# Patient Record
Sex: FEMALE | Race: OTHER | Employment: FULL TIME | URBAN - NONMETROPOLITAN AREA
[De-identification: names, ages, dates, MRNs, and addresses within clinical notes are randomized per-mention and may not be internally consistent; named-entity substitution may affect disease eponyms.]

---
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
## 2019-10-15 ENCOUNTER — HOSPITAL ENCOUNTER (OUTPATIENT)
Age: 19
Discharge: HOME | End: 2019-10-15

## 2021-10-18 ENCOUNTER — OFFICE VISIT (OUTPATIENT)
Dept: FAMILY MEDICINE CLINIC | Age: 21
End: 2021-10-18
Payer: MEDICARE

## 2021-10-18 VITALS
SYSTOLIC BLOOD PRESSURE: 128 MMHG | RESPIRATION RATE: 16 BRPM | WEIGHT: 191 LBS | DIASTOLIC BLOOD PRESSURE: 60 MMHG | BODY MASS INDEX: 31.82 KG/M2 | HEART RATE: 96 BPM | HEIGHT: 65 IN

## 2021-10-18 DIAGNOSIS — Z00.00 ANNUAL PHYSICAL EXAM: Primary | ICD-10-CM

## 2021-10-18 DIAGNOSIS — Z23 NEED FOR INFLUENZA VACCINATION: ICD-10-CM

## 2021-10-18 PROCEDURE — 90674 CCIIV4 VAC NO PRSV 0.5 ML IM: CPT | Performed by: FAMILY MEDICINE

## 2021-10-18 PROCEDURE — 99385 PREV VISIT NEW AGE 18-39: CPT | Performed by: FAMILY MEDICINE

## 2021-10-18 PROCEDURE — 90471 IMMUNIZATION ADMIN: CPT | Performed by: FAMILY MEDICINE

## 2021-10-18 PROCEDURE — G8482 FLU IMMUNIZE ORDER/ADMIN: HCPCS | Performed by: FAMILY MEDICINE

## 2021-10-18 RX ORDER — ETONOGESTREL AND ETHINYL ESTRADIOL 11.7; 2.7 MG/1; MG/1
INSERT, EXTENDED RELEASE VAGINAL
COMMUNITY
Start: 2021-10-07

## 2021-10-18 SDOH — ECONOMIC STABILITY: FOOD INSECURITY: WITHIN THE PAST 12 MONTHS, THE FOOD YOU BOUGHT JUST DIDN'T LAST AND YOU DIDN'T HAVE MONEY TO GET MORE.: PATIENT DECLINED

## 2021-10-18 SDOH — ECONOMIC STABILITY: FOOD INSECURITY: WITHIN THE PAST 12 MONTHS, YOU WORRIED THAT YOUR FOOD WOULD RUN OUT BEFORE YOU GOT MONEY TO BUY MORE.: PATIENT DECLINED

## 2021-10-18 ASSESSMENT — ENCOUNTER SYMPTOMS
BLOOD IN STOOL: 0
NAUSEA: 0
SHORTNESS OF BREATH: 0
DIARRHEA: 0
VOMITING: 0
EYES NEGATIVE: 1
ABDOMINAL PAIN: 0

## 2021-10-18 ASSESSMENT — PATIENT HEALTH QUESTIONNAIRE - PHQ9
SUM OF ALL RESPONSES TO PHQ9 QUESTIONS 1 & 2: 0
SUM OF ALL RESPONSES TO PHQ QUESTIONS 1-9: 0
1. LITTLE INTEREST OR PLEASURE IN DOING THINGS: 0
SUM OF ALL RESPONSES TO PHQ QUESTIONS 1-9: 0
SUM OF ALL RESPONSES TO PHQ QUESTIONS 1-9: 0
2. FEELING DOWN, DEPRESSED OR HOPELESS: 0

## 2021-10-18 ASSESSMENT — SOCIAL DETERMINANTS OF HEALTH (SDOH): HOW HARD IS IT FOR YOU TO PAY FOR THE VERY BASICS LIKE FOOD, HOUSING, MEDICAL CARE, AND HEATING?: PATIENT DECLINED

## 2021-10-18 NOTE — PROGRESS NOTES
Immunizations Administered     Name Date Dose Route    Influenza, MDCK Quadv, IM, PF (Flucelvax 2 yrs and older) 10/18/2021 0.5 mL Intramuscular    Site: Deltoid- Left    Lot: [id number]    NDC: 36591-786-16          Vaccine Information Sheet, \"Influenza - Inactivated\"  given to Sharlene Moore, or parent/legal guardian of  Sharlene Moore and verbalized understanding. Patient responses:    Have you ever had a reaction to a flu vaccine? No  Do you have an allergy to eggs, Latex -induced anaphylaxis, neomycin or polymixin? No  Do you have an allergy to Thimerosal, contact lens solution, or Merthiolate? No  Have you ever had [place] Syndrome? No  Do you have any current illness? No  Do you have a temperature above 100.4 degrees? No  Are you pregnant? No  If pregnant, permission obtained from physician? N/A  Do you have an active neurological disorder? No      Flu vaccine given per order. Please see immunization tab.

## 2021-10-18 NOTE — PROGRESS NOTES
Chief Complaint   Patient presents with    New Patient     No concerns, wants flu shot       SUBJECTIVE     Con Rivas is a 23 y. o.female      Patient presents to [facility] care. Previous PCP was Dr. Lyn Pierce. She has been generally healthy. She denies any current complaints. States that she was generally seeing her PCP for yearly exams and would occasionally see him for torticollis. She uses NuvaRing to control her menses. She sees a GYN for Pap testing. Request flu shots today. Non-smoker. 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 BMI 31.78. Past Medical History:   Diagnosis Date    Allergic     Headache     IBS (irritable bowel syndrome)     Inflammatory bowel disease     Scoliosis     Torticollis     Vision abnormalities        Past Surgical History:   Procedure Laterality Date    ADENOIDECTOMY      KNEE ARTHROSCOPY Left     TONSILLECTOMY         Allergies   Allergen Reactions    Cefdinir Nausea Only      Outpatient Medications Prior to Visit   Medication Sig Dispense Refill    etonogestrel-ethinyl estradiol (NUVARING) 0.12-0.015 MG/24HR vaginal ring       ibuprofen (ADVIL;MOTRIN) 600 MG tablet Take 600 mg by mouth 3 times daily  0    acetaminophen (TYLENOL) 325 MG tablet Take 650 mg by mouth every 6 hours as needed for Pain      polyethylene glycol (GLYCOLAX) powder MIX 17 GRAMS WITH WATER AND DRINK ONCE TO THREE TIMES DAILY TO ACHIEVE 2 TO 3 SOFT STOOLS DAILY 527 g 0    ondansetron (ZOFRAN) 4 MG tablet Take 1 tablet by mouth every 8 hours as needed for Nausea or Vomiting 20 tablet 0    fluticasone (FLONASE) 50 MCG/ACT nasal spray 1 spray by Nasal route daily      MONONESSA 0.25-35 MG-MCG per tablet daily  11    ARTIFICIAL TEARS 1.4 % ophthalmic solution as needed  2     No facility-administered medications prior to visit.        Family History   Problem Relation Age of Onset    Arthritis Mother     High Blood Pressure Mother     Anxiety Disorder Father     Asthma Sister     Thyroid Disease Maternal Grandmother     Cancer Paternal Grandfather         Thyroid         Social History     Tobacco Use    Smoking status: Never Smoker    Smokeless tobacco: Never Used   Vaping Use    Vaping Use: Never used   Substance Use Topics    Alcohol use: No    Drug use: No         Review of Systems   Constitutional: Negative for chills, fatigue and fever. HENT: Negative. Eyes: Negative. Respiratory: Negative for shortness of breath. Cardiovascular: Negative for chest pain, palpitations and leg swelling. Gastrointestinal: Negative for abdominal pain, blood in stool, diarrhea, nausea and vomiting. Genitourinary: Negative for dysuria and menstrual problem. Musculoskeletal: Negative. Skin: Negative for rash. Neurological: Negative for dizziness and headaches. Hematological: Negative. Psychiatric/Behavioral: Negative. All other systems reviewed and are negative. OBJECTIVE     /60   Pulse 96   Resp 16   Ht 5' 5\" (1.651 m)   Wt 191 lb (86.6 kg)   BMI 31.78 kg/m²     Physical Exam  Vitals and nursing note reviewed. Constitutional:       General: She is not in acute distress. Appearance: She is well-developed. HENT:      Head: Normocephalic and atraumatic. Right Ear: Tympanic membrane normal.      Left Ear: Tympanic membrane normal.      Mouth/Throat:      Mouth: Mucous membranes are moist.      Pharynx: No posterior oropharyngeal erythema. Eyes:      Conjunctiva/sclera: Conjunctivae normal.   Neck:      Thyroid: No thyromegaly. Cardiovascular:      Rate and Rhythm: Normal rate and regular rhythm. Heart sounds: No murmur heard. Pulmonary:      Effort: Pulmonary effort is normal.      Breath sounds: Normal breath sounds. No wheezing. Abdominal:      General: Bowel sounds are normal.      Palpations: Abdomen is soft. Tenderness: There is no abdominal tenderness. There is no guarding or rebound.    Musculoskeletal:      Cervical back:

## 2021-10-18 NOTE — PATIENT INSTRUCTIONS
Patient Education        Well Visit, Ages 25 to 48: Care Instructions  Overview     Well visits can help you stay healthy. Your doctor has checked your overall health and may have suggested ways to take good care of yourself. Your doctor also may have recommended tests. At home, you can help prevent illness with healthy eating, regular exercise, and other steps. Follow-up care is a key part of your treatment and safety. Be sure to make and go to all appointments, and call your doctor if you are having problems. It's also a good idea to know your test results and keep a list of the medicines you take. How can you care for yourself at home? · Get screening tests that you and your doctor decide on. Screening helps find diseases before any symptoms appear. · Eat healthy foods. Choose fruits, vegetables, whole grains, protein, and low-fat dairy foods. Limit fat, especially saturated fat. Reduce salt in your diet. · Limit alcohol. If you are a man, have no more than 2 drinks a day or 14 drinks a week. If you are a woman, have no more than 1 drink a day or 7 drinks a week. · Get at least 30 minutes of physical activity on most days of the week. Walking is a good choice. You also may want to do other activities, such as running, swimming, cycling, or playing tennis or team sports. Discuss any changes in your exercise program with your doctor. · Reach and stay at a healthy weight. This will lower your risk for many problems, such as obesity, diabetes, heart disease, and high blood pressure. · Do not smoke or allow others to smoke around you. If you need help quitting, talk to your doctor about stop-smoking programs and medicines. These can increase your chances of quitting for good. · Care for your mental health. It is easy to get weighed down by worry and stress. Learn strategies to manage stress, like deep breathing and mindfulness, and stay connected with your family and community.  If you find you often feel sad or hopeless, talk with your doctor. Treatment can help. · Talk to your doctor about whether you have any risk factors for sexually transmitted infections (STIs). You can help prevent STIs if you wait to have sex with a new partner (or partners) until you've each been tested for STIs. It also helps if you use condoms (male or female condoms) and if you limit your sex partners to one person who only has sex with you. Vaccines are available for some STIs, such as HPV. · Use birth control if it's important to you to prevent pregnancy. Talk with your doctor about the choices available and what might be best for you. · If you think you may have a problem with alcohol or drug use, talk to your doctor. This includes prescription medicines (such as amphetamines and opioids) and illegal drugs (such as cocaine and methamphetamine). Your doctor can help you figure out what type of treatment is best for you. · Protect your skin from too much sun. When you're outdoors from 10 a.m. to 4 p.m., stay in the shade or cover up with clothing and a hat with a wide brim. Wear sunglasses that block UV rays. Even when it's cloudy, put broad-spectrum sunscreen (SPF 30 or higher) on any exposed skin. · See a dentist one or two times a year for checkups and to have your teeth cleaned. · Wear a seat belt in the car. When should you call for help? Watch closely for changes in your health, and be sure to contact your doctor if you have any problems or symptoms that concern you. Where can you learn more? Go to https://[email or web address]. org and sign in to your [employer] account. Enter P072 in the [employer] box to learn more about \"Well Visit, Ages 25 to 48: Care Instructions. \"     If you do not have an account, please click on the \"Sign Up Now\" link. Current as of: February 11, 2021               Content Version: 13.0  © 2084-1630 Healthwise, Incorporated.    Care instructions adapted under license by [facility]. If you have questions about a medical condition or this instruction, always ask your healthcare professional. [place] [address] any warranty or liability for your use of this information.

## 2022-12-01 ENCOUNTER — OFFICE VISIT (OUTPATIENT)
Dept: FAMILY MEDICINE CLINIC | Age: 22
End: 2022-12-01
Payer: MEDICARE

## 2022-12-01 VITALS
RESPIRATION RATE: 16 BRPM | SYSTOLIC BLOOD PRESSURE: 120 MMHG | HEART RATE: 86 BPM | HEIGHT: 63 IN | DIASTOLIC BLOOD PRESSURE: 72 MMHG | TEMPERATURE: 98.3 F | WEIGHT: 200.4 LBS | BODY MASS INDEX: 35.51 KG/M2

## 2022-12-01 DIAGNOSIS — M25.50 ARTHRALGIA, UNSPECIFIED JOINT: ICD-10-CM

## 2022-12-01 DIAGNOSIS — Z13.1 SCREENING FOR DIABETES MELLITUS: ICD-10-CM

## 2022-12-01 DIAGNOSIS — Z82.61 FAMILY HISTORY OF RHEUMATOID ARTHRITIS: ICD-10-CM

## 2022-12-01 DIAGNOSIS — M25.40 JOINT SWELLING: ICD-10-CM

## 2022-12-01 DIAGNOSIS — Z00.00 WELL ADULT EXAM: Primary | ICD-10-CM

## 2022-12-01 DIAGNOSIS — R53.83 FATIGUE, UNSPECIFIED TYPE: ICD-10-CM

## 2022-12-01 DIAGNOSIS — Z13.220 SCREENING, LIPID: ICD-10-CM

## 2022-12-01 PROCEDURE — 99395 PREV VISIT EST AGE 18-39: CPT | Performed by: NURSE PRACTITIONER

## 2022-12-01 PROCEDURE — G8484 FLU IMMUNIZE NO ADMIN: HCPCS | Performed by: NURSE PRACTITIONER

## 2022-12-01 RX ORDER — FLUOXETINE HYDROCHLORIDE 20 MG/1
20 CAPSULE ORAL DAILY
Qty: 30 CAPSULE | Refills: 1 | Status: SHIPPED | OUTPATIENT
Start: 2022-12-01

## 2022-12-01 SDOH — ECONOMIC STABILITY: FOOD INSECURITY: WITHIN THE PAST 12 MONTHS, THE FOOD YOU BOUGHT JUST DIDN'T LAST AND YOU DIDN'T HAVE MONEY TO GET MORE.: PATIENT DECLINED

## 2022-12-01 SDOH — ECONOMIC STABILITY: FOOD INSECURITY: WITHIN THE PAST 12 MONTHS, YOU WORRIED THAT YOUR FOOD WOULD RUN OUT BEFORE YOU GOT MONEY TO BUY MORE.: PATIENT DECLINED

## 2022-12-01 ASSESSMENT — PATIENT HEALTH QUESTIONNAIRE - PHQ9
SUM OF ALL RESPONSES TO PHQ QUESTIONS 1-9: 0
2. FEELING DOWN, DEPRESSED OR HOPELESS: 0
1. LITTLE INTEREST OR PLEASURE IN DOING THINGS: 0
SUM OF ALL RESPONSES TO PHQ QUESTIONS 1-9: 0
SUM OF ALL RESPONSES TO PHQ9 QUESTIONS 1 & 2: 0

## 2022-12-01 ASSESSMENT — SOCIAL DETERMINANTS OF HEALTH (SDOH): HOW HARD IS IT FOR YOU TO PAY FOR THE VERY BASICS LIKE FOOD, HOUSING, MEDICAL CARE, AND HEATING?: PATIENT DECLINED

## 2022-12-01 NOTE — PROGRESS NOTES
Chief Complaint   Patient presents with    Hand Pain     Stiffness in hands and knees x couple weeks. Discuss Medications     Discuss anxiety medication. Javy Perry is a 20 y. o.female    Pt presents for annual wellness physical exam.      Pt complains of joint pain and joints locking up. This is located in the hands and knees mostly. Sometimes hips. They will cramp and wake her up. Some edema. Pt notes her anxiety seems to be getting worse. She states she has been picking at her fingers more. Mom questions some depression as well. More of a \"doom and gloom\" type personality. Some sleep issues but is working nights. She is interested in medications. Review of Systems   Constitutional:  Negative for chills, diaphoresis and fever. Respiratory:  Negative for shortness of breath. Cardiovascular:  Negative for chest pain, palpitations and leg swelling. Gastrointestinal:  Negative for blood in stool, constipation, diarrhea, nausea and vomiting. Genitourinary:  Negative for dysuria and hematuria. Musculoskeletal:  Positive for arthralgias and joint swelling. Negative for myalgias. Neurological:  Negative for dizziness and headaches. Psychiatric/Behavioral:  Positive for sleep disturbance. The patient is nervous/anxious. All other systems reviewed and are negative. OBJECTIVE     /72 (Site: Right Upper Arm)   Pulse 86   Temp 98.3 °F (36.8 °C) (Oral)   Resp 16   Ht 5' 3.25\" (1.607 m)   Wt 200 lb 6.4 oz (90.9 kg)   BMI 35.22 kg/m²     Wt Readings from Last 3 Encounters:   12/01/22 200 lb 6.4 oz (90.9 kg)   10/18/21 191 lb (86.6 kg)   03/24/17 189 lb (85.7 kg) (97 %, Z= 1.85)*     * Growth percentiles are based on CDC (Girls, 2-20 Years) data. Physical Exam  Vitals and nursing note reviewed. Constitutional:       Appearance: She is well-developed. She is obese. HENT:      Head: Normocephalic and atraumatic.       Right Ear: External ear normal. Left Ear: External ear normal.      Nose: Nose normal.   Eyes:      Conjunctiva/sclera: Conjunctivae normal.      Pupils: Pupils are equal, round, and reactive to light. Cardiovascular:      Rate and Rhythm: Normal rate and regular rhythm. Heart sounds: Normal heart sounds. Pulmonary:      Effort: Pulmonary effort is normal.      Breath sounds: Normal breath sounds. Abdominal:      General: Bowel sounds are normal.      Palpations: Abdomen is soft. Musculoskeletal:         General: Normal range of motion. Cervical back: Normal range of motion and neck supple. Skin:     General: Skin is warm and dry. Neurological:      Mental Status: She is alert and oriented to person, place, and time. Deep Tendon Reflexes: Reflexes are normal and symmetric. Psychiatric:         Behavior: Behavior normal.         Thought Content:  Thought content normal.         Judgment: Judgment normal.           Immunization History   Administered Date(s) Administered    COVID-19, MODERNA BLUE border, Primary or Immunocompromised, (age 12y+), IM, 100 mcg/0.5mL 08/24/2021, 09/21/2021    DTaP vaccine 01/18/2000, 03/13/2000, 05/17/2000, 12/21/2000, 03/22/2005    HIB PRP-T (ActHIB, Hiberix) 01/18/2000, 03/13/2000, 05/17/2000    HPV Quadrivalent (Gardasil) 11/23/2010, 12/30/2010, 05/25/2011    Hepatitis A Ped/Adol (Havrix, Vaqta) 11/23/2010, 05/25/2011    Hepatitis B Ped/Adol (Engerix-B, Recombivax HB) 01/18/2000, 03/13/2000, 08/17/2000    Hib PRP-OMP (PedvaxHIB) 12/21/2000    Influenza, FLUARIX, FLULAVAL, FLUZONE (age 10 mo+) AND AFLURIA, (age 1 y+), PF, 0.5mL 08/16/2018, 10/13/2020    Influenza, FLUCELVAX, (age 10 mo+), MDCK, PF, 0.5mL 10/18/2021    Influenza, FLUMIST, (age 2-51 y), Live, Intranasal, 0.2mL 10/18/2013    Influenza, Triv, 3 Years and older, IM, PF (Afluria 5yrs and older) 10/21/2017    MMR 12/21/2000, 03/22/2005    Meningococcal B, OMV (Bexsero) 06/21/2018, 08/30/2018    Meningococcal MCV4P (Menactra) 11/23/2010, 11/25/2015    Polio IPV (IPOL) 01/18/2000, 03/13/2000, 05/17/2000, 03/22/2005    Tdap (Boostrix, Adacel) 11/23/2010    Varicella (Varivax) 12/21/2000, 11/23/2010         Health Maintenance   Topic Date Due    Pap smear  Never done    DTaP/Tdap/Td vaccine (7 - Td or Tdap) 11/23/2020    COVID-19 Vaccine (3 - Booster for Tessa Carwin series) 11/16/2021    Flu vaccine (1) 08/01/2022    HIV screen  12/01/2023 (Originally 11/12/2014)    [address] screen  12/01/2023 (Originally 11/12/2015)    Depression Screen  12/01/2023    Hepatitis A vaccine  Completed    Hib vaccine  Completed    HPV vaccine  Completed    Varicella vaccine  Completed    Meningococcal (ACWY) vaccine  Completed    Pneumococcal 0-64 years Vaccine  Aged Out    Hepatitis C screen  Discontinued         ASSESSMENT       Diagnosis Orders   1. Well adult exam        2. Screening for diabetes mellitus  Comprehensive Metabolic Panel      3. Fatigue, unspecified type  T4, Free    TSH    CBC with Auto Differential    Sedimentation Rate    Rheumatoid Factor    CHIKIS Screen with Reflex    C-Reactive Protein      4. Arthralgia, unspecified joint  T4, Free    TSH    CBC with Auto Differential    Sedimentation Rate    Rheumatoid Factor    CHIKIS Screen with Reflex    C-Reactive Protein      5. Joint swelling  T4, Free    TSH    CBC with Auto Differential    Sedimentation Rate    Rheumatoid Factor    CHIKIS Screen with Reflex    C-Reactive Protein      6. Screening, lipid  Lipid Panel      7.  Family history of rheumatoid arthritis            PLAN        Orders Placed This Encounter   Procedures    T4, Free     Standing Status:   Future     Number of Occurrences:   1     Standing Expiration Date:   12/1/2023    TSH     Standing Status:   Future     Number of Occurrences:   1     Standing Expiration Date:   12/1/2023    CBC with Auto Differential     Standing Status:   Future     Number of Occurrences:   1     Standing Expiration Date:   12/1/2023    Comprehensive Metabolic Panel     Standing Status:   Future     Number of Occurrences:   1     Standing Expiration Date:   12/1/2023    Lipid Panel     Standing Status:   Future     Number of Occurrences:   1     Standing Expiration Date:   12/1/2023     Order Specific Question:   Is Patient Fasting?/# of Hours     Answer:   yes/12    Sedimentation Rate     Standing Status:   Future     Number of Occurrences:   1     Standing Expiration Date:   12/1/2023    Rheumatoid Factor     Standing Status:   Future     Number of Occurrences:   1     Standing Expiration Date:   12/1/2023    CHIKIS Screen with Reflex     Standing Status:   Future     Number of Occurrences:   1     Standing Expiration Date:   12/1/2023    C-Reactive Protein     Standing Status:   Future     Number of Occurrences:   1     Standing Expiration Date:   12/1/2023       Requested Prescriptions     Signed Prescriptions Disp Refills    FLUoxetine (PROZAC) 20 MG capsule 30 capsule 1     Sig: Take 1 capsule by mouth daily     Pap/pelvic - appt coming up  Labs as above  Will start patient on Prozac  Follow up in 4-6 weeks    Electronically signed by ARSH Barton CNP on 12/5/2022 at 8:53 AM

## 2022-12-03 ENCOUNTER — NURSE ONLY (OUTPATIENT)
Dept: LAB | Age: 22
End: 2022-12-03

## 2022-12-03 DIAGNOSIS — R53.83 FATIGUE, UNSPECIFIED TYPE: ICD-10-CM

## 2022-12-03 DIAGNOSIS — Z13.1 SCREENING FOR DIABETES MELLITUS: ICD-10-CM

## 2022-12-03 DIAGNOSIS — Z13.220 SCREENING, LIPID: ICD-10-CM

## 2022-12-03 DIAGNOSIS — M25.50 ARTHRALGIA, UNSPECIFIED JOINT: ICD-10-CM

## 2022-12-03 DIAGNOSIS — M25.40 JOINT SWELLING: ICD-10-CM

## 2022-12-03 LAB
ALBUMIN SERPL-MCNC: 4 G/DL (ref 3.5–5.1)
ALP BLD-CCNC: 113 U/L (ref 38–126)
ALT SERPL-CCNC: 14 U/L (ref 11–66)
ANION GAP SERPL CALCULATED.3IONS-SCNC: 12 MEQ/L (ref 8–16)
AST SERPL-CCNC: 16 U/L (ref 5–40)
BASOPHILS # BLD: 0.5 %
BASOPHILS ABSOLUTE: 0 THOU/MM3 (ref 0–0.1)
BILIRUB SERPL-MCNC: 0.5 MG/DL (ref 0.3–1.2)
BUN BLDV-MCNC: 11 MG/DL (ref 7–22)
C-REACTIVE PROTEIN: 2.05 MG/DL (ref 0–1)
CALCIUM SERPL-MCNC: 9.6 MG/DL (ref 8.5–10.5)
CHLORIDE BLD-SCNC: 106 MEQ/L (ref 98–111)
CHOLESTEROL, TOTAL: 198 MG/DL (ref 100–199)
CO2: 22 MEQ/L (ref 23–33)
CREAT SERPL-MCNC: 0.8 MG/DL (ref 0.4–1.2)
EOSINOPHIL # BLD: 1.3 %
EOSINOPHILS ABSOLUTE: 0.1 THOU/MM3 (ref 0–0.4)
ERYTHROCYTE [DISTWIDTH] IN BLOOD BY AUTOMATED COUNT: 12.8 % (ref 11.5–14.5)
ERYTHROCYTE [DISTWIDTH] IN BLOOD BY AUTOMATED COUNT: 40.6 FL (ref 35–45)
GFR SERPL CREATININE-BSD FRML MDRD: > 60 ML/MIN/1.73M2
GLUCOSE BLD-MCNC: 86 MG/DL (ref 70–108)
HCT VFR BLD CALC: 42.7 % (ref 37–47)
HDLC SERPL-MCNC: 56 MG/DL
HEMOGLOBIN: 14 GM/DL (ref 12–16)
IMMATURE GRANS (ABS): 0.03 THOU/MM3 (ref 0–0.07)
IMMATURE GRANULOCYTES: 0.3 %
LDL CHOLESTEROL CALCULATED: 119 MG/DL
LYMPHOCYTES # BLD: 27.6 %
LYMPHOCYTES ABSOLUTE: 2.7 THOU/MM3 (ref 1–4.8)
MCH RBC QN AUTO: 28.8 PG (ref 26–33)
MCHC RBC AUTO-ENTMCNC: 32.8 GM/DL (ref 32.2–35.5)
MCV RBC AUTO: 87.9 FL (ref 81–99)
MONOCYTES # BLD: 5.4 %
MONOCYTES ABSOLUTE: 0.5 THOU/MM3 (ref 0.4–1.3)
NUCLEATED RED BLOOD CELLS: 0 /100 WBC
PLATELET # BLD: 310 THOU/MM3 (ref 130–400)
PMV BLD AUTO: 10.9 FL (ref 9.4–12.4)
POTASSIUM SERPL-SCNC: 4.6 MEQ/L (ref 3.5–5.2)
RBC # BLD: 4.86 MILL/MM3 (ref 4.2–5.4)
RHEUMATOID FACTOR: < 10 IU/ML (ref 0–13)
SEDIMENTATION RATE, ERYTHROCYTE: 41 MM/HR (ref 0–20)
SEG NEUTROPHILS: 64.9 %
SEGMENTED NEUTROPHILS ABSOLUTE COUNT: 6.4 THOU/MM3 (ref 1.8–7.7)
SODIUM BLD-SCNC: 140 MEQ/L (ref 135–145)
T4 FREE: 1.07 NG/DL (ref 0.93–1.76)
TOTAL PROTEIN: 7.3 G/DL (ref 6.1–8)
TRIGL SERPL-MCNC: 116 MG/DL (ref 0–199)
TSH SERPL DL<=0.05 MIU/L-ACNC: 3.06 UIU/ML (ref 0.4–4.2)
WBC # BLD: 9.9 THOU/MM3 (ref 4.8–10.8)

## 2022-12-05 ENCOUNTER — TELEPHONE (OUTPATIENT)
Dept: FAMILY MEDICINE CLINIC | Age: 22
End: 2022-12-05

## 2022-12-05 DIAGNOSIS — R70.0 ELEVATED SED RATE: Primary | ICD-10-CM

## 2022-12-05 DIAGNOSIS — Z82.61 FAMILY HISTORY OF RHEUMATOID ARTHRITIS: ICD-10-CM

## 2022-12-05 DIAGNOSIS — R79.82 ELEVATED C-REACTIVE PROTEIN (CRP): ICD-10-CM

## 2022-12-05 ASSESSMENT — ENCOUNTER SYMPTOMS
BLOOD IN STOOL: 0
CONSTIPATION: 0
DIARRHEA: 0
NAUSEA: 0
SHORTNESS OF BREATH: 0
VOMITING: 0

## 2022-12-05 NOTE — TELEPHONE ENCOUNTER
----- Message from ARSH Siu - CNP sent at 12/5/2022  8:17 AM EST -----  Sed rate and CRP are elevated - can refer to rheumatology if patient desires. Rheumatoid factor is negative  Thyroid is normal  FLP looks good  CMP is normal  CBC is normal.  Still awaiting CHIKIS.  Thanks, TS

## 2023-01-05 ENCOUNTER — OFFICE VISIT (OUTPATIENT)
Dept: RHEUMATOLOGY | Age: 23
End: 2023-01-05
Payer: MEDICARE

## 2023-01-05 ENCOUNTER — PATIENT MESSAGE (OUTPATIENT)
Dept: FAMILY MEDICINE CLINIC | Age: 23
End: 2023-01-05

## 2023-01-05 VITALS
DIASTOLIC BLOOD PRESSURE: 78 MMHG | OXYGEN SATURATION: 98 % | SYSTOLIC BLOOD PRESSURE: 122 MMHG | BODY MASS INDEX: 34.2 KG/M2 | HEIGHT: 63 IN | WEIGHT: 193 LBS | HEART RATE: 74 BPM

## 2023-01-05 DIAGNOSIS — Z79.899 ENCOUNTER FOR LONG-TERM (CURRENT) USE OF HIGH-RISK MEDICATION: ICD-10-CM

## 2023-01-05 DIAGNOSIS — M19.90 INFLAMMATORY ARTHRITIS: Primary | ICD-10-CM

## 2023-01-05 PROCEDURE — G8484 FLU IMMUNIZE NO ADMIN: HCPCS | Performed by: INTERNAL MEDICINE

## 2023-01-05 PROCEDURE — 1036F TOBACCO NON-USER: CPT | Performed by: INTERNAL MEDICINE

## 2023-01-05 PROCEDURE — 99204 OFFICE O/P NEW MOD 45 MIN: CPT | Performed by: INTERNAL MEDICINE

## 2023-01-05 PROCEDURE — G8427 DOCREV CUR MEDS BY ELIG CLIN: HCPCS | Performed by: INTERNAL MEDICINE

## 2023-01-05 PROCEDURE — G8417 CALC BMI ABV UP PARAM F/U: HCPCS | Performed by: INTERNAL MEDICINE

## 2023-01-05 RX ORDER — ACETAMINOPHEN 160 MG
2000 TABLET,DISINTEGRATING ORAL 2 TIMES DAILY
COMMUNITY

## 2023-01-05 RX ORDER — CELECOXIB 200 MG/1
200 CAPSULE ORAL 2 TIMES DAILY
COMMUNITY

## 2023-01-05 RX ORDER — VITAMIN B COMPLEX
1 CAPSULE ORAL DAILY
COMMUNITY

## 2023-01-05 RX ORDER — FOLIC ACID 1 MG/1
1 TABLET ORAL DAILY
Qty: 30 TABLET | Refills: 1 | Status: SHIPPED | OUTPATIENT
Start: 2023-01-05 | End: 2023-02-04

## 2023-01-05 RX ORDER — VIT C/B6/B5/MAGNESIUM/HERB 173 50-5-6-5MG
CAPSULE ORAL DAILY
COMMUNITY

## 2023-01-05 RX ORDER — CHLORAL HYDRATE 500 MG
4000 CAPSULE ORAL DAILY
COMMUNITY

## 2023-01-05 ASSESSMENT — ENCOUNTER SYMPTOMS
ABDOMINAL PAIN: 0
COUGH: 0
VOMITING: 0
NAUSEA: 0
SHORTNESS OF BREATH: 0
BLOOD IN STOOL: 0

## 2023-01-05 ASSESSMENT — JOINT PAIN
TOTAL NUMBER OF SWOLLEN JOINTS: 5
TOTAL NUMBER OF TENDER JOINTS: 8

## 2023-01-05 NOTE — PROGRESS NOTES
[facility]   Rheumatology Clinic Note      1/5/2023       Reason for Consult:  joint pain  Requesting Physician:  Rupali Solomon APRN *     CHIEF COMPLAINT:    Chief Complaint   Patient presents with    New Patient     NP - elevated sed rate, CRP and family history of RA    Other     Pt has pain in both hands and right elbow.           HISTORY OF PRESENT ILLNESS:    22 y.o. female presents today for evaluation of joint pain. Her mother has rheumatoid arthritis, and is concerned that she may have the same.    Noted to have joint pain, swelling and prolonged morning stiffness for few months that has been progressively worsening. Pain is mostly in her hands, wrists. Also involving shoulders. Morning stiffness is prolonged. Episdoes of joint swelling. Celebrex has helped partially.    No skin rash. Has dry eyes and mouth.  No photosensitivity.  No oral ulcers.  No alopecia.      Past Medical History:     has a past medical history of Allergic, Headache, IBS (irritable bowel syndrome), Inflammatory bowel disease, Scoliosis, Torticollis, and Vision abnormalities.    Past Surgical History:     has a past surgical history that includes Adenoidectomy; Tonsillectomy; and Knee arthroscopy (Left).    Current Medications:      Current Outpatient Medications:     Omega-3 1000 MG CAPS, Take 4,000 mg by mouth daily, Disp: , Rfl:     turmeric (QC TUMERIC COMPLEX) 500 MG CAPS, Take by mouth daily, Disp: , Rfl:     Probiotic Product (PROBIOTIC-10 PO), Take 10 mg by mouth daily, Disp: , Rfl:     Cholecalciferol (VITAMIN D3) 50 MCG (2000 UT) CAPS, Take 2,000 capsules by mouth in the morning and at bedtime, Disp: , Rfl:     b complex vitamins capsule, Take 1 capsule by mouth daily, Disp: , Rfl:     celecoxib (CELEBREX) 200 MG capsule, Take 200 mg by mouth 2 times daily, Disp: , Rfl:     methotrexate (RHEUMATREX) 2.5 MG chemo tablet, Take 6 tablets by mouth once a week, Disp: 24 tablet, Rfl: 1    folic acid (FOLVITE) 1 MG  tablet, Take 1 tablet by mouth daily, Disp: 30 tablet, Rfl: 1    FLUoxetine (PROZAC) 20 MG capsule, Take 1 capsule by mouth daily, Disp: 30 capsule, Rfl: 1    etonogestrel-ethinyl estradiol (NUVARING) 0.12-0.015 MG/24HR vaginal ring, , Disp: , Rfl:     acetaminophen (TYLENOL) 325 MG tablet, Take 650 mg by mouth every 6 hours as needed for Pain, Disp: , Rfl:     Allergies:    Cefdinir    Social History:     reports that she has never smoked. She has never used smokeless tobacco. She reports that she does not drink alcohol and does not use drugs. Family History:   family history includes Anxiety Disorder in her father; Arthritis in her mother; Asthma in her sister; Cancer in her paternal grandfather; High Blood Pressure in her mother; Thyroid Disease in her maternal grandmother. REVIEW OF SYSTEMS:    Review of Systems   Constitutional:  Positive for fatigue. Negative for chills, fever and unexpected weight change. HENT:  Negative for mouth sores. Respiratory:  Negative for cough and shortness of breath. Cardiovascular:  Negative for chest pain and leg swelling. Gastrointestinal:  Negative for abdominal pain, blood in stool, nausea and vomiting. Skin:  Negative for rash. PHYSICAL EXAM:    Vitals:    /78 (Site: Left Upper Arm, Position: Sitting, Cuff Size: Medium Adult)   Pulse 74   Ht 5' 3\" (1.6 m)   Wt 193 lb (87.5 kg)   SpO2 98%   BMI 34.19 kg/m²     Physical Exam  Constitutional:       General: She is not in acute distress. Appearance: Normal appearance. HENT:      Mouth/Throat:      Mouth: Mucous membranes are dry. Pharynx: Oropharynx is clear. Eyes:      Extraocular Movements: Extraocular movements intact. Conjunctiva/sclera: Conjunctivae normal.   Cardiovascular:      Rate and Rhythm: Normal rate and regular rhythm. Heart sounds: Normal heart sounds. No murmur heard. No friction rub.    Pulmonary:      Effort: Pulmonary effort is normal. No respiratory distress. Breath sounds: Normal breath sounds. No wheezing or rhonchi. Musculoskeletal:         General: Swelling and tenderness present. No deformity. Normal range of motion. Cervical back: Normal range of motion and neck supple. Right lower leg: No edema. Left lower leg: No edema. Lymphadenopathy:      Cervical: No cervical adenopathy. Skin:     General: Skin is warm and dry. Findings: No rash. Neurological:      General: No focal deficit present. Mental Status: She is alert and oriented to person, place, and time. Mental status is at baseline.       Gait: Gait normal.   Psychiatric:         Mood and Affect: Mood normal.      Physical Exam        ONEAL-28 (ESR): 4.91 (Moderate disease activity)  ONEAL-28 (CRP): 4.37 (Moderate disease activity)         DATA:     Latest Reference Range & Units 12/3/22 06:53   Sodium 135 - 145 meq/L 140   Potassium 3.5 - 5.2 meq/L 4.6   Chloride 98 - 111 meq/L 106   CO2 23 - 33 meq/L 22 (L)   BUN,BUNPL 7 - 22 mg/dL 11   Creatinine 0.4 - 1.2 mg/dL 0.8   Anion Gap 8.0 - 16.0 meq/L 12.0   Est, Glom Filt Rate >60 ml/min/1.73m2 >60   Glucose, Random 70 - 108 mg/dL 86   CALCIUM, SERUM, 942830 8.5 - 10.5 mg/dL 9.6   Total Protein 6.1 - 8.0 g/dL 7.3      Latest Reference Range & Units 12/3/22 06:53   CRP 0.00 - 1.00 mg/dl 2.05 (H)      Latest Reference Range & Units 12/3/22 06:53   Albumin 3.5 - 5.1 g/dL 4.0   Alk Phos 38 - 126 U/L 113   ALT 11 - 66 U/L 14   AST 5 - 40 U/L 16   Bilirubin 0.3 - 1.2 mg/dL 0.5   Total Protein 6.1 - 8.0 g/dL 7.3      Latest Reference Range & Units 12/3/22 06:53   WBC 4.8 - 10.8 thou/mm3 9.9   RBC 4.20 - 5.40 mill/mm3 4.86   Hemoglobin Quant 12.0 - 16.0 gm/dl 14.0   Hematocrit 37.0 - 47.0 % 42.7   MCV 81.0 - 99.0 fL 87.9   MCH 26.0 - 33.0 pg 28.8   MCHC 32.2 - 35.5 gm/dl 32.8   MPV 9.4 - 12.4 fL 10.9   RDW-CV 11.5 - 14.5 % 12.8   RDW-SD 35.0 - 45.0 fL 40.6   Platelet Count 220 - 400 thou/mm3 310      Latest Reference Range & Units 12/3/22 06:53   Sed Rate 0 - 20 mm/hr 41 (H)      Latest Reference Range & Units 12/3/22 06:53   CHIKIS SCREEN None Detected  Detected ! Rheumatoid Factor 0 - 13 IU/mL < 10          IMPRESSION/RECOMMENDATIONS:      1. Inflammatory arthritis, polyarticular symmetric, strong family h/o RA, she has positive CHIKIS. -- obtain labs today to assess AVISE CTD panel  -- discussed with pt treatment options. She agreed to start methotrexate 15 mg PO once weekly and folic acid 1 mg daily. Counseled pt about pregnancy risk when taking this medication and she expressed understanding. Discussed with pt the benefits, risks and adverse effects of this medication. Patient expressed understanding. 2. High risk med requiring close monitoring:  -- MTX: monitor CBC and CMP every 4-6 weeks     RTC in 6 weeks        Orders Placed This Encounter   Procedures    Miscellaneous Sendout     Standing Status:   Future     Standing Expiration Date:   7/5/2023     Order Specific Question:   Specify Req.  Test (1 Test/Order)     Answer:   AVISE CTD PANEL    CBC with Auto Differential     Standing Status:   Future     Standing Expiration Date:   7/5/2023    Comprehensive Metabolic Panel     Standing Status:   Future     Standing Expiration Date:   7/5/2023    C-Reactive Protein     Standing Status:   Future     Standing Expiration Date:   7/5/2023    Sedimentation Rate     Standing Status:   Future     Standing Expiration Date:   7/5/2023        Emelia Peres MD    [address] [facility]  [address] [facility]. [address] [facility]  [address]

## 2023-01-05 NOTE — TELEPHONE ENCOUNTER
From: Neal Eckert  To: Chuy Mane  Sent: 1/5/2023 11:42 AM EST  Subject: Prozac    Stevan Quintero,    My anxiety med was helping but I am starting to feel I am getting used to it and would like to know if it can be increased or piggybacked with something else. I feel my anxiety spiking more regularly.      Thanks,  [employer]

## 2023-01-06 RX ORDER — FLUOXETINE HYDROCHLORIDE 40 MG/1
40 CAPSULE ORAL DAILY
Qty: 30 CAPSULE | Refills: 1 | Status: SHIPPED | OUTPATIENT
Start: 2023-01-06

## 2023-01-07 ENCOUNTER — PATIENT MESSAGE (OUTPATIENT)
Dept: RHEUMATOLOGY | Age: 23
End: 2023-01-07

## 2023-01-09 RX ORDER — METHYLPREDNISOLONE 4 MG/1
TABLET ORAL
Qty: 1 KIT | Refills: 0 | Status: SHIPPED | OUTPATIENT
Start: 2023-01-09 | End: 2023-01-15

## 2023-01-09 RX ORDER — CELECOXIB 200 MG/1
200 CAPSULE ORAL DAILY
Qty: 60 CAPSULE | Refills: 3 | Status: SHIPPED | OUTPATIENT
Start: 2023-01-09

## 2023-01-18 ENCOUNTER — NURSE ONLY (OUTPATIENT)
Dept: LAB | Age: 23
End: 2023-01-18

## 2023-01-18 DIAGNOSIS — M19.90 INFLAMMATORY ARTHRITIS: ICD-10-CM

## 2023-01-18 DIAGNOSIS — Z79.899 ENCOUNTER FOR LONG-TERM (CURRENT) USE OF HIGH-RISK MEDICATION: ICD-10-CM

## 2023-01-18 LAB
ALBUMIN SERPL-MCNC: 4.4 G/DL (ref 3.5–5.1)
ALP BLD-CCNC: 106 U/L (ref 38–126)
ALT SERPL-CCNC: 14 U/L (ref 11–66)
ANION GAP SERPL CALCULATED.3IONS-SCNC: 14 MEQ/L (ref 8–16)
AST SERPL-CCNC: 17 U/L (ref 5–40)
BASOPHILS # BLD: 0.7 %
BASOPHILS ABSOLUTE: 0.1 THOU/MM3 (ref 0–0.1)
BILIRUB SERPL-MCNC: 0.8 MG/DL (ref 0.3–1.2)
BUN BLDV-MCNC: 15 MG/DL (ref 7–22)
C-REACTIVE PROTEIN: 1.95 MG/DL (ref 0–1)
CALCIUM SERPL-MCNC: 9.1 MG/DL (ref 8.5–10.5)
CHLORIDE BLD-SCNC: 102 MEQ/L (ref 98–111)
CO2: 23 MEQ/L (ref 23–33)
CREAT SERPL-MCNC: 0.7 MG/DL (ref 0.4–1.2)
EOSINOPHIL # BLD: 0.6 %
EOSINOPHILS ABSOLUTE: 0.1 THOU/MM3 (ref 0–0.4)
ERYTHROCYTE [DISTWIDTH] IN BLOOD BY AUTOMATED COUNT: 12.8 % (ref 11.5–14.5)
ERYTHROCYTE [DISTWIDTH] IN BLOOD BY AUTOMATED COUNT: 40.6 FL (ref 35–45)
GFR SERPL CREATININE-BSD FRML MDRD: > 60 ML/MIN/1.73M2
GLUCOSE BLD-MCNC: 84 MG/DL (ref 70–108)
HCT VFR BLD CALC: 41.3 % (ref 37–47)
HEMOGLOBIN: 13.7 GM/DL (ref 12–16)
IMMATURE GRANS (ABS): 0.05 THOU/MM3 (ref 0–0.07)
IMMATURE GRANULOCYTES: 0.5 %
LYMPHOCYTES # BLD: 21.5 %
LYMPHOCYTES ABSOLUTE: 2.3 THOU/MM3 (ref 1–4.8)
MCH RBC QN AUTO: 28.8 PG (ref 26–33)
MCHC RBC AUTO-ENTMCNC: 33.2 GM/DL (ref 32.2–35.5)
MCV RBC AUTO: 86.9 FL (ref 81–99)
MONOCYTES # BLD: 6.5 %
MONOCYTES ABSOLUTE: 0.7 THOU/MM3 (ref 0.4–1.3)
NUCLEATED RED BLOOD CELLS: 0 /100 WBC
PLATELET # BLD: 284 THOU/MM3 (ref 130–400)
PMV BLD AUTO: 10.7 FL (ref 9.4–12.4)
POTASSIUM SERPL-SCNC: 4.2 MEQ/L (ref 3.5–5.2)
RBC # BLD: 4.75 MILL/MM3 (ref 4.2–5.4)
SEDIMENTATION RATE, ERYTHROCYTE: 23 MM/HR (ref 0–20)
SEG NEUTROPHILS: 70.2 %
SEGMENTED NEUTROPHILS ABSOLUTE COUNT: 7.6 THOU/MM3 (ref 1.8–7.7)
SODIUM BLD-SCNC: 139 MEQ/L (ref 135–145)
TOTAL PROTEIN: 7.1 G/DL (ref 6.1–8)
WBC # BLD: 10.8 THOU/MM3 (ref 4.8–10.8)

## 2023-02-02 RX ORDER — FLUOXETINE HYDROCHLORIDE 40 MG/1
40 CAPSULE ORAL DAILY
Qty: 30 CAPSULE | Refills: 1 | OUTPATIENT
Start: 2023-02-02

## 2023-02-02 NOTE — TELEPHONE ENCOUNTER
Fluoxetine refused due to being filled 1/6/23 #30/1 to [address]. Pt scheduled for appt with TS on 2/7/23, will discuss need for refills at appt.

## 2023-02-07 ENCOUNTER — OFFICE VISIT (OUTPATIENT)
Dept: FAMILY MEDICINE CLINIC | Age: 23
End: 2023-02-07
Payer: MEDICAID

## 2023-02-07 VITALS
DIASTOLIC BLOOD PRESSURE: 84 MMHG | BODY MASS INDEX: 33.66 KG/M2 | RESPIRATION RATE: 16 BRPM | HEART RATE: 76 BPM | SYSTOLIC BLOOD PRESSURE: 122 MMHG | WEIGHT: 190 LBS

## 2023-02-07 DIAGNOSIS — G47.00 INSOMNIA, UNSPECIFIED TYPE: ICD-10-CM

## 2023-02-07 DIAGNOSIS — F41.9 ANXIETY: ICD-10-CM

## 2023-02-07 DIAGNOSIS — F32.A DEPRESSION, UNSPECIFIED DEPRESSION TYPE: Primary | ICD-10-CM

## 2023-02-07 PROCEDURE — 99213 OFFICE O/P EST LOW 20 MIN: CPT | Performed by: NURSE PRACTITIONER

## 2023-02-07 RX ORDER — BUPROPION HYDROCHLORIDE 150 MG/1
150 TABLET ORAL EVERY MORNING
Qty: 30 TABLET | Refills: 3 | Status: SHIPPED | OUTPATIENT
Start: 2023-02-07

## 2023-02-07 RX ORDER — HYDROXYZINE HYDROCHLORIDE 25 MG/1
25 TABLET, FILM COATED ORAL EVERY 8 HOURS PRN
Qty: 30 TABLET | Refills: 0 | Status: SHIPPED | OUTPATIENT
Start: 2023-02-07 | End: 2023-02-17

## 2023-02-07 SDOH — ECONOMIC STABILITY: FOOD INSECURITY: WITHIN THE PAST 12 MONTHS, THE FOOD YOU BOUGHT JUST DIDN'T LAST AND YOU DIDN'T HAVE MONEY TO GET MORE.: PATIENT DECLINED

## 2023-02-07 SDOH — ECONOMIC STABILITY: FOOD INSECURITY: WITHIN THE PAST 12 MONTHS, YOU WORRIED THAT YOUR FOOD WOULD RUN OUT BEFORE YOU GOT MONEY TO BUY MORE.: PATIENT DECLINED

## 2023-02-07 SDOH — ECONOMIC STABILITY: HOUSING INSECURITY
IN THE LAST 12 MONTHS, WAS THERE A TIME WHEN YOU DID NOT HAVE A STEADY PLACE TO SLEEP OR SLEPT IN A SHELTER (INCLUDING NOW)?: PATIENT REFUSED

## 2023-02-07 SDOH — ECONOMIC STABILITY: INCOME INSECURITY: HOW HARD IS IT FOR YOU TO PAY FOR THE VERY BASICS LIKE FOOD, HOUSING, MEDICAL CARE, AND HEATING?: PATIENT DECLINED

## 2023-02-07 ASSESSMENT — PATIENT HEALTH QUESTIONNAIRE - PHQ9
2. FEELING DOWN, DEPRESSED OR HOPELESS: 1
SUM OF ALL RESPONSES TO PHQ QUESTIONS 1-9: 1
1. LITTLE INTEREST OR PLEASURE IN DOING THINGS: 0
SUM OF ALL RESPONSES TO PHQ9 QUESTIONS 1 & 2: 1

## 2023-02-07 NOTE — PROGRESS NOTES
Chief Complaint   Patient presents with    Follow-up     Wants to discuss medication dosage. Radha White is a 20 y. o.female      Here for follow up of anxiety and depression. Feels like anxiety is better but still struggles with depression. Fatigue, not wanting to get out of bed, decreased desire to do things, trouble sleeping at night. She is interested in adding on Wellbutrin XL. Tried atarax for sleep and this did help. Review of Systems   Constitutional:  Positive for fatigue. Negative for chills, diaphoresis and fever. Respiratory:  Negative for shortness of breath. Cardiovascular:  Negative for chest pain, palpitations and leg swelling. Gastrointestinal:  Negative for blood in stool, constipation, diarrhea, nausea and vomiting. Genitourinary:  Negative for dysuria and hematuria. Musculoskeletal:  Negative for myalgias. Neurological:  Negative for dizziness and headaches. Psychiatric/Behavioral:  Positive for decreased concentration and sleep disturbance. All other systems reviewed and are negative. OBJECTIVE     /84   Pulse 76   Resp 16   Wt 190 lb (86.2 kg)   BMI 33.66 kg/m²     Wt Readings from Last 3 Encounters:   02/07/23 190 lb (86.2 kg)   01/05/23 193 lb (87.5 kg)   12/01/22 200 lb 6.4 oz (90.9 kg)       Physical Exam  Vitals and nursing note reviewed. Constitutional:       Appearance: She is well-developed. HENT:      Head: Normocephalic and atraumatic. Right Ear: External ear normal.      Left Ear: External ear normal.      Nose: Nose normal.   Eyes:      Conjunctiva/sclera: Conjunctivae normal.      Pupils: Pupils are equal, round, and reactive to light. Cardiovascular:      Rate and Rhythm: Normal rate and regular rhythm. Heart sounds: Normal heart sounds. Pulmonary:      Effort: Pulmonary effort is normal.      Breath sounds: Normal breath sounds.    Abdominal:      General: Bowel sounds are normal. Palpations: Abdomen is soft. Musculoskeletal:         General: Normal range of motion. Cervical back: Normal range of motion and neck supple. Skin:     General: Skin is warm and dry. Neurological:      Mental Status: She is alert and oriented to person, place, and time. Deep Tendon Reflexes: Reflexes are normal and symmetric. Psychiatric:         Behavior: Behavior normal.         Thought Content:  Thought content normal.         Judgment: Judgment normal.             Immunization History   Administered Date(s) Administered    COVID-19, MODERNA BLUE border, Primary or Immunocompromised, (age 12y+), IM, 100 mcg/0.5mL 08/24/2021, 09/21/2021    DTaP vaccine 01/18/2000, 03/13/2000, 05/17/2000, 12/21/2000, 03/22/2005    HIB PRP-T (ActHIB, Hiberix) 01/18/2000, 03/13/2000, 05/17/2000    HPV Quadrivalent (Gardasil) 11/23/2010, 12/30/2010, 05/25/2011    Hepatitis A Ped/Adol (Havrix, Vaqta) 11/23/2010, 05/25/2011    Hepatitis B Ped/Adol (Engerix-B, Recombivax HB) 01/18/2000, 03/13/2000, 08/17/2000    Hib PRP-OMP (PedvaxHIB) 12/21/2000    Influenza, FLUARIX, FLULAVAL, FLUZONE (age 10 mo+) AND AFLURIA, (age 1 y+), PF, 0.5mL 08/16/2018, 10/13/2020    Influenza, FLUCELVAX, (age 10 mo+), MDCK, PF, 0.5mL 10/18/2021    Influenza, FLUMIST, (age 2-51 y), Live, Intranasal, 0.2mL 10/18/2013    Influenza, Triv, 3 Years and older, IM, PF (Afluria 5yrs and older) 10/21/2017    MMR 12/21/2000, 03/22/2005    Meningococcal B, OMV (Bexsero) 06/21/2018, 08/30/2018    Meningococcal MCV4P (Menactra) 11/23/2010, 11/25/2015    Polio IPV (IPOL) 01/18/2000, 03/13/2000, 05/17/2000, 03/22/2005    Tdap (Boostrix, Adacel) 11/23/2010    Varicella (Varivax) 12/21/2000, 11/23/2010         Health Maintenance   Topic Date Due    DTaP/Tdap/Td vaccine (7 - Td or Tdap) 11/23/2020    COVID-19 Vaccine (3 - Booster for Shayla Bernard series) 11/16/2021    Flu vaccine (1) 08/01/2022    HIV screen  12/01/2023 (Originally 11/12/2014)    [facility] 12/01/2023 (Originally 11/12/2015)    Depression Screen  02/07/2024    Pap smear  01/31/2026    Hepatitis A vaccine  Completed    Hib vaccine  Completed    HPV vaccine  Completed    Varicella vaccine  Completed    Meningococcal (ACWY) vaccine  Completed    Pneumococcal 0-64 years Vaccine  Aged Out    Hepatitis C screen  Discontinued       Future Appointments   Date Time Provider Ger Jensen   2/20/2023  2:30 PM [address] MD N [facility]         ASSESSMENT       Diagnosis Orders   1. Depression, unspecified depression type  buPROPion (WELLBUTRIN XL) 150 MG extended release tablet      2. Anxiety  hydrOXYzine HCl (ATARAX) 25 MG tablet    buPROPion (WELLBUTRIN XL) 150 MG extended release tablet      3. Insomnia, unspecified type  hydrOXYzine HCl (ATARAX) 25 MG tablet          PLAN        Requested Prescriptions     Signed Prescriptions Disp Refills    hydrOXYzine HCl (ATARAX) 25 MG tablet 30 tablet 0     Sig: Take 1 tablet by mouth every 8 hours as needed for Anxiety    buPROPion (WELLBUTRIN XL) 150 MG extended release tablet 30 tablet 3     Sig: Take 1 tablet by mouth every morning       Meds sent to pharmacy  If tolerating, okay to follow up in 6 months       Return in about 6 months (around 8/7/2023).         Electronically signed by ARSH Osborne CNP on 2/8/2023 at 7:41 PM

## 2023-02-08 ASSESSMENT — ENCOUNTER SYMPTOMS
BLOOD IN STOOL: 0
NAUSEA: 0
CONSTIPATION: 0
DIARRHEA: 0
VOMITING: 0
SHORTNESS OF BREATH: 0

## 2023-02-28 ENCOUNTER — TELEPHONE (OUTPATIENT)
Dept: FAMILY MEDICINE CLINIC | Age: 23
End: 2023-02-28

## 2023-02-28 NOTE — TELEPHONE ENCOUNTER
Pt called in requesting medication dosage increase. She is currently taking Wellbutrin  mg and feels that she would benefit with a higher dosage. Pt is requesting Wellbutrin  mg to be sent to [facility]. Please advise.      Please mychart pt back with response.

## 2023-02-28 NOTE — TELEPHONE ENCOUNTER
It has only been 3 weeks, has not had maximum benefit yet. Would recommend giving it another 3 weeks and then reassess.  TS

## 2023-03-02 ENCOUNTER — OFFICE VISIT (OUTPATIENT)
Dept: RHEUMATOLOGY | Age: 23
End: 2023-03-02
Payer: MEDICAID

## 2023-03-02 ENCOUNTER — NURSE ONLY (OUTPATIENT)
Dept: LAB | Age: 23
End: 2023-03-02

## 2023-03-02 VITALS
SYSTOLIC BLOOD PRESSURE: 132 MMHG | HEART RATE: 112 BPM | DIASTOLIC BLOOD PRESSURE: 98 MMHG | OXYGEN SATURATION: 98 % | WEIGHT: 190 LBS | HEIGHT: 63 IN | BODY MASS INDEX: 33.66 KG/M2

## 2023-03-02 DIAGNOSIS — Z79.899 ENCOUNTER FOR LONG-TERM (CURRENT) USE OF HIGH-RISK MEDICATION: ICD-10-CM

## 2023-03-02 DIAGNOSIS — M19.90 INFLAMMATORY ARTHRITIS: ICD-10-CM

## 2023-03-02 LAB
ALBUMIN SERPL BCG-MCNC: 4.1 G/DL (ref 3.5–5.1)
ALP SERPL-CCNC: 85 U/L (ref 38–126)
ALT SERPL W/O P-5'-P-CCNC: 11 U/L (ref 11–66)
ANION GAP SERPL CALC-SCNC: 12 MEQ/L (ref 8–16)
AST SERPL-CCNC: 12 U/L (ref 5–40)
BASOPHILS ABSOLUTE: 0.1 THOU/MM3 (ref 0–0.1)
BASOPHILS NFR BLD AUTO: 0.6 %
BILIRUB SERPL-MCNC: 0.4 MG/DL (ref 0.3–1.2)
BUN SERPL-MCNC: 20 MG/DL (ref 7–22)
CALCIUM SERPL-MCNC: 9.3 MG/DL (ref 8.5–10.5)
CHLORIDE SERPL-SCNC: 105 MEQ/L (ref 98–111)
CO2 SERPL-SCNC: 22 MEQ/L (ref 23–33)
CREAT SERPL-MCNC: 0.7 MG/DL (ref 0.4–1.2)
CRP SERPL-MCNC: 1.53 MG/DL (ref 0–1)
DEPRECATED RDW RBC AUTO: 47.8 FL (ref 35–45)
EOSINOPHIL NFR BLD AUTO: 1.5 %
EOSINOPHILS ABSOLUTE: 0.1 THOU/MM3 (ref 0–0.4)
ERYTHROCYTE [DISTWIDTH] IN BLOOD BY AUTOMATED COUNT: 14.7 % (ref 11.5–14.5)
ERYTHROCYTE [SEDIMENTATION RATE] IN BLOOD BY WESTERGREN METHOD: 34 MM/HR (ref 0–20)
GFR SERPL CREATININE-BSD FRML MDRD: > 60 ML/MIN/1.73M2
GLUCOSE SERPL-MCNC: 93 MG/DL (ref 70–108)
HCT VFR BLD AUTO: 41.6 % (ref 37–47)
HGB BLD-MCNC: 13.6 GM/DL (ref 12–16)
IMM GRANULOCYTES # BLD AUTO: 0.04 THOU/MM3 (ref 0–0.07)
IMM GRANULOCYTES NFR BLD AUTO: 0.5 %
LYMPHOCYTES ABSOLUTE: 2.2 THOU/MM3 (ref 1–4.8)
LYMPHOCYTES NFR BLD AUTO: 25.7 %
MCH RBC QN AUTO: 29.2 PG (ref 26–33)
MCHC RBC AUTO-ENTMCNC: 32.7 GM/DL (ref 32.2–35.5)
MCV RBC AUTO: 89.3 FL (ref 81–99)
MONOCYTES ABSOLUTE: 0.7 THOU/MM3 (ref 0.4–1.3)
MONOCYTES NFR BLD AUTO: 8.7 %
NEUTROPHILS NFR BLD AUTO: 63 %
NRBC BLD AUTO-RTO: 0 /100 WBC
PLATELET # BLD AUTO: 299 THOU/MM3 (ref 130–400)
PMV BLD AUTO: 10.6 FL (ref 9.4–12.4)
POTASSIUM SERPL-SCNC: 4.2 MEQ/L (ref 3.5–5.2)
PROT SERPL-MCNC: 7.3 G/DL (ref 6.1–8)
RBC # BLD AUTO: 4.66 MILL/MM3 (ref 4.2–5.4)
SEGMENTED NEUTROPHILS ABSOLUTE COUNT: 5.4 THOU/MM3 (ref 1.8–7.7)
SODIUM SERPL-SCNC: 139 MEQ/L (ref 135–145)
WBC # BLD AUTO: 8.5 THOU/MM3 (ref 4.8–10.8)

## 2023-03-02 PROCEDURE — 99214 OFFICE O/P EST MOD 30 MIN: CPT | Performed by: INTERNAL MEDICINE

## 2023-03-02 RX ORDER — FOLIC ACID 1 MG/1
1 TABLET ORAL DAILY
Qty: 30 TABLET | Refills: 1 | Status: SHIPPED | OUTPATIENT
Start: 2023-03-02 | End: 2023-04-01

## 2023-03-02 ASSESSMENT — JOINT PAIN
TOTAL NUMBER OF TENDER JOINTS: 1
TOTAL NUMBER OF SWOLLEN JOINTS: 2

## 2023-03-02 ASSESSMENT — ENCOUNTER SYMPTOMS
VOMITING: 0
NAUSEA: 0
SHORTNESS OF BREATH: 0
BLOOD IN STOOL: 0
COUGH: 0
ABDOMINAL PAIN: 0

## 2023-03-02 NOTE — PROGRESS NOTES
[address]   Rheumatology Clinic Note      3/2/2023       Reason for Consult:  joint pain  Requesting Physician:  No ref. provider found     CHIEF COMPLAINT:    Chief Complaint   Patient presents with    Follow-up     6 wk f/u            HISTORY OF PRESENT ILLNESS:    20 y.o. female with polyarticular inflammatory arthritis presents for follow up. In last office visit, methotrexate 15 mg PO weekly and folic acid 1 mg daily were started. Reports improvement in her joint pain and swelling since starting methotrexate. However, reports that effects do not last till her next dose. Tends to have pain and swelling in her joints 2-3 days before her next dose is due. Her pain usually affects her hands, lower back and right shoulder. Since seen last, reports that she has fractured her foot when she twisted it. Has a hard boot on. Past Medical History:     has a past medical history of Allergic, Headache, IBS (irritable bowel syndrome), Inflammatory bowel disease, Scoliosis, Torticollis, and Vision abnormalities. Past Surgical History:     has a past surgical history that includes Adenoidectomy; Tonsillectomy; and Knee arthroscopy (Left).     Current Medications:      Current Outpatient Medications:     folic acid (FOLVITE) 1 MG tablet, Take 1 tablet by mouth daily, Disp: 30 tablet, Rfl: 1    methotrexate (RHEUMATREX) 2.5 MG chemo tablet, Take 8 tablets by mouth once a week, Disp: 24 tablet, Rfl: 1    buPROPion (WELLBUTRIN XL) 150 MG extended release tablet, Take 1 tablet by mouth every morning, Disp: 30 tablet, Rfl: 3    celecoxib (CELEBREX) 200 MG capsule, Take 1 capsule by mouth daily, Disp: 60 capsule, Rfl: 3    FLUoxetine (PROZAC) 40 MG capsule, Take 1 capsule by mouth daily, Disp: 30 capsule, Rfl: 1    Omega-3 1000 MG CAPS, Take 4,000 mg by mouth daily, Disp: , Rfl:     turmeric 500 MG CAPS, Take by mouth daily, Disp: , Rfl:     Probiotic Product (PROBIOTIC-10 PO), Take 10 mg by mouth daily, Disp: , Rfl:     Cholecalciferol (VITAMIN D3) 50 MCG (2000 UT) CAPS, Take 2,000 capsules by mouth in the morning and at bedtime, Disp: , Rfl:     b complex vitamins capsule, Take 1 capsule by mouth daily, Disp: , Rfl:     etonogestrel-ethinyl estradiol (NUVARING) 0.12-0.015 MG/24HR vaginal ring, , Disp: , Rfl:     acetaminophen (TYLENOL) 325 MG tablet, Take 650 mg by mouth every 6 hours as needed for Pain, Disp: , Rfl:     Allergies:    Cefdinir    Social History:     reports that she has never smoked. She has never used smokeless tobacco. She reports that she does not drink alcohol and does not use drugs. Family History:   family history includes Anxiety Disorder in her father; Arthritis in her mother; Asthma in her sister; Cancer in her paternal grandfather; High Blood Pressure in her mother; Thyroid Disease in her maternal grandmother. REVIEW OF SYSTEMS:    Review of Systems   Constitutional:  Positive for fatigue. Negative for chills, fever and unexpected weight change. HENT:  Negative for mouth sores. Respiratory:  Negative for cough and shortness of breath. Cardiovascular:  Negative for chest pain and leg swelling. Gastrointestinal:  Negative for abdominal pain, blood in stool, nausea and vomiting. Skin:  Negative for rash. PHYSICAL EXAM:    Vitals:    BP (!) 132/98 (Site: Left Upper Arm, Position: Sitting, Cuff Size: Medium Adult)   Pulse (!) 112   Ht 5' 2.99\" (1.6 m)   Wt 190 lb (86.2 kg)   SpO2 98%   BMI 33.67 kg/m²     Physical Exam  Constitutional:       General: She is not in acute distress. Appearance: Normal appearance. Eyes:      Conjunctiva/sclera: Conjunctivae normal.   Pulmonary:      Effort: Pulmonary effort is normal.   Musculoskeletal:         General: Swelling and tenderness present. Normal range of motion. Cervical back: Neck supple. Skin:     General: Skin is warm and dry. Findings: No rash.    Neurological:      General: No focal deficit present.      Mental Status: She is alert and oriented to person, place, and time.      Gait: Abnormal gait: has a stability boot in right foot.   Psychiatric:         Mood and Affect: Mood normal.      Physical Exam        ONEAL-28 (ESR): 3.19 (Low disease activity)  ONEAL-28 (CRP): 2.35 (Remission)         DATA:     Latest Reference Range & Units 1/18/23 14:45   Sodium 135 - 145 meq/L 139   Potassium 3.5 - 5.2 meq/L 4.2   Chloride 98 - 111 meq/L 102   CO2 23 - 33 meq/L 23   BUN,BUNPL 7 - 22 mg/dL 15   Creatinine 0.4 - 1.2 mg/dL 0.7   Anion Gap 8.0 - 16.0 meq/L 14.0   Est, Glom Filt Rate >60 ml/min/1.73m2 >60   Glucose, Random 70 - 108 mg/dL 84   CALCIUM, SERUM, 217048 8.5 - 10.5 mg/dL 9.1   Total Protein 6.1 - 8.0 g/dL 7.1       Latest Reference Range & Units 12/3/22 06:53 1/18/23 14:45   CRP 0.00 - 1.00 mg/dl 2.05 (H) 1.95 (H)      Latest Reference Range & Units 1/18/23 14:45   Albumin 3.5 - 5.1 g/dL 4.4   Alk Phos 38 - 126 U/L 106   ALT 11 - 66 U/L 14   AST 5 - 40 U/L 17   BILIRUBIN TOTAL 0.3 - 1.2 mg/dL 0.8   Total Protein 6.1 - 8.0 g/dL 7.1      Latest Reference Range & Units 1/18/23 14:45   WBC 4.8 - 10.8 thou/mm3 10.8   RBC 4.20 - 5.40 mill/mm3 4.75   Hemoglobin Quant 12.0 - 16.0 gm/dl 13.7   Hematocrit 37.0 - 47.0 % 41.3   MCV 81.0 - 99.0 fL 86.9   MCH 26.0 - 33.0 pg 28.8   MCHC 32.2 - 35.5 gm/dl 33.2   MPV 9.4 - 12.4 fL 10.7   RDW-CV 11.5 - 14.5 % 12.8   RDW-SD 35.0 - 45.0 fL 40.6   Platelet Count 130 - 400 thou/mm3 284      Latest Reference Range & Units 12/3/22 06:53 1/18/23 14:45   Sed Rate 0 - 20 mm/hr 41 (H) 23 (H)         AVISE CTD 1/20/2023:            RAPID3 Composite Score = MDHAQ (0-10) + Patient pain VAS (0-10): + Patient global assessment VAS (0-10):     3/2/2023 --- RAPID 3: 1.3 + 6.5 + 3 = 10.8     Remission: <3  Low Disease Activity: <6  Moderate Disease Activity: >=6 and <=12  High Disease Activity: >12      IMPRESSION/RECOMMENDATIONS:      1. Inflammatory arthritis, polyarticular symmetric,  strong family h/o RA, she has positive CHIKIS. Overall improvement since starting methotrexate  -- increase methotrexate to 20 mg PO once weekly and continue folic acid 1 mg daily      2. High risk med requiring close monitoring:  -- MTX: monitor CBC and CMP every 6-8 weeks.  Obtain labs today    RTC in 6 weeks        Orders Placed This Encounter   Procedures    Comprehensive Metabolic Panel     Standing Status:   Future     Standing Expiration Date:   9/2/2023    CBC with Auto Differential     Standing Status:   Future     Standing Expiration Date:   9/2/2023    Sedimentation Rate     Standing Status:   Future     Standing Expiration Date:   9/2/2023    C-Reactive Protein     Standing Status:   Future     Standing Expiration Date:   9/2/2023    CBC with Auto Differential     Standing Status:   Future     Standing Expiration Date:   9/2/2023    Comprehensive Metabolic Panel     Standing Status:   Future     Standing Expiration Date:   3/2/2024    C-Reactive Protein     Standing Status:   Future     Standing Expiration Date:   3/2/2024    Sedimentation Rate     Standing Status:   Future     Standing Expiration Date:   3/2/2024        Dilshad Levy MD    [address] [facility]  [address] [facility]. [address] [facility]  [address] [facility]  [address]

## 2023-03-09 ENCOUNTER — HOSPITAL ENCOUNTER (OUTPATIENT)
Age: 23
Discharge: HOME | End: 2023-03-09
Payer: MEDICAID

## 2023-03-09 ENCOUNTER — HOSPITAL ENCOUNTER (OUTPATIENT)
Dept: GENERAL RADIOLOGY | Age: 23
Discharge: HOME | End: 2023-03-09
Payer: MEDICAID

## 2023-03-09 DIAGNOSIS — M25.571 RIGHT ANKLE PAIN, UNSPECIFIED CHRONICITY: ICD-10-CM

## 2023-03-09 PROCEDURE — 73610 X-RAY EXAM OF ANKLE: CPT

## 2023-03-30 DIAGNOSIS — F41.9 ANXIETY: ICD-10-CM

## 2023-03-30 DIAGNOSIS — G47.00 INSOMNIA, UNSPECIFIED TYPE: ICD-10-CM

## 2023-03-30 RX ORDER — HYDROXYZINE HYDROCHLORIDE 25 MG/1
25 TABLET, FILM COATED ORAL EVERY 8 HOURS PRN
Qty: 30 TABLET | Refills: 0 | Status: SHIPPED | OUTPATIENT
Start: 2023-03-30 | End: 2023-04-09

## 2023-03-30 NOTE — TELEPHONE ENCOUNTER
This medication refill is regarding a electronic request. Refill requested by  Annamaria Gowers . Requested Prescriptions     Pending Prescriptions Disp Refills    hydrOXYzine HCl (ATARAX) 25 MG tablet [Pharmacy Med Name: HYDROXYZINE HCL 25MG TABS (WHITE)] 30 tablet 0     Sig: TAKE 1 TABLET BY MOUTH EVERY 8 HOURS AS NEEDED FOR ANXIETY     Date of last visit: 2/7/2023   Date of next visit: None  Date of last refill: 2/7/23 #30/0    Rx verified, ordered and set to EP.

## 2023-03-31 ENCOUNTER — PATIENT MESSAGE (OUTPATIENT)
Dept: FAMILY MEDICINE CLINIC | Age: 23
End: 2023-03-31

## 2023-03-31 RX ORDER — FLUOXETINE HYDROCHLORIDE 40 MG/1
40 CAPSULE ORAL DAILY
Qty: 90 CAPSULE | Refills: 1 | Status: SHIPPED | OUTPATIENT
Start: 2023-03-31

## 2023-03-31 NOTE — TELEPHONE ENCOUNTER
From: Virginia Mabry  To: Yeni Curtis  Sent: 3/31/2023 10:49 AM EDT  Subject: Refill    Hi. Could I please have refills for my Prozac 40 mg.      Thanks,  [employer]

## 2023-04-04 ENCOUNTER — TELEPHONE (OUTPATIENT)
Dept: FAMILY MEDICINE CLINIC | Age: 23
End: 2023-04-04

## 2023-04-04 DIAGNOSIS — Z83.2 FAMILY HISTORY OF FACTOR V LEIDEN MUTATION: ICD-10-CM

## 2023-04-04 DIAGNOSIS — Z82.49 FAMILY HISTORY OF PULMONARY EMBOLISM: Primary | ICD-10-CM

## 2023-04-04 NOTE — TELEPHONE ENCOUNTER
Lazarus Dikcens. I just found out that my uncle tested positive for Factor V(5) and MTFHR and my grandmother  from PE.  Could you please lmk if we require an appointment to get tested or if we can have an order sent in?

## 2023-04-07 ENCOUNTER — NURSE ONLY (OUTPATIENT)
Dept: LAB | Age: 23
End: 2023-04-07

## 2023-04-07 DIAGNOSIS — Z83.2 FAMILY HISTORY OF FACTOR V LEIDEN MUTATION: ICD-10-CM

## 2023-04-07 DIAGNOSIS — Z79.899 ENCOUNTER FOR LONG-TERM (CURRENT) USE OF HIGH-RISK MEDICATION: ICD-10-CM

## 2023-04-07 DIAGNOSIS — Z82.49 FAMILY HISTORY OF PULMONARY EMBOLISM: ICD-10-CM

## 2023-04-07 DIAGNOSIS — M19.90 INFLAMMATORY ARTHRITIS: ICD-10-CM

## 2023-04-07 LAB
ALBUMIN SERPL BCG-MCNC: 4.3 G/DL (ref 3.5–5.1)
ALP SERPL-CCNC: 118 U/L (ref 38–126)
ALT SERPL W/O P-5'-P-CCNC: 19 U/L (ref 11–66)
ANION GAP SERPL CALC-SCNC: 12 MEQ/L (ref 8–16)
AST SERPL-CCNC: 18 U/L (ref 5–40)
BASOPHILS ABSOLUTE: 0.1 THOU/MM3 (ref 0–0.1)
BASOPHILS NFR BLD AUTO: 0.8 %
BILIRUB SERPL-MCNC: 0.8 MG/DL (ref 0.3–1.2)
BUN SERPL-MCNC: 16 MG/DL (ref 7–22)
CALCIUM SERPL-MCNC: 9.5 MG/DL (ref 8.5–10.5)
CHLORIDE SERPL-SCNC: 104 MEQ/L (ref 98–111)
CO2 SERPL-SCNC: 25 MEQ/L (ref 23–33)
CREAT SERPL-MCNC: 0.8 MG/DL (ref 0.4–1.2)
CRP SERPL-MCNC: 0.56 MG/DL (ref 0–1)
DEPRECATED RDW RBC AUTO: 47.7 FL (ref 35–45)
EOSINOPHIL NFR BLD AUTO: 2 %
EOSINOPHILS ABSOLUTE: 0.2 THOU/MM3 (ref 0–0.4)
ERYTHROCYTE [DISTWIDTH] IN BLOOD BY AUTOMATED COUNT: 14.2 % (ref 11.5–14.5)
ERYTHROCYTE [SEDIMENTATION RATE] IN BLOOD BY WESTERGREN METHOD: 23 MM/HR (ref 0–20)
GFR SERPL CREATININE-BSD FRML MDRD: > 60 ML/MIN/1.73M2
GLUCOSE SERPL-MCNC: 92 MG/DL (ref 70–108)
HCT VFR BLD AUTO: 43.3 % (ref 37–47)
HGB BLD-MCNC: 13.9 GM/DL (ref 12–16)
IMM GRANULOCYTES # BLD AUTO: 0.02 THOU/MM3 (ref 0–0.07)
IMM GRANULOCYTES NFR BLD AUTO: 0.2 %
LYMPHOCYTES ABSOLUTE: 3.1 THOU/MM3 (ref 1–4.8)
LYMPHOCYTES NFR BLD AUTO: 35.5 %
MCH RBC QN AUTO: 29.6 PG (ref 26–33)
MCHC RBC AUTO-ENTMCNC: 32.1 GM/DL (ref 32.2–35.5)
MCV RBC AUTO: 92.3 FL (ref 81–99)
MONOCYTES ABSOLUTE: 0.6 THOU/MM3 (ref 0.4–1.3)
MONOCYTES NFR BLD AUTO: 6.4 %
MTHFR BY PCR SPECIMEN: NORMAL
NEUTROPHILS NFR BLD AUTO: 55.1 %
NRBC BLD AUTO-RTO: 0 /100 WBC
PLATELET # BLD AUTO: 315 THOU/MM3 (ref 130–400)
PMV BLD AUTO: 10.2 FL (ref 9.4–12.4)
POTASSIUM SERPL-SCNC: 4.2 MEQ/L (ref 3.5–5.2)
PROT SERPL-MCNC: 7.4 G/DL (ref 6.1–8)
RBC # BLD AUTO: 4.69 MILL/MM3 (ref 4.2–5.4)
SEGMENTED NEUTROPHILS ABSOLUTE COUNT: 4.7 THOU/MM3 (ref 1.8–7.7)
SODIUM SERPL-SCNC: 141 MEQ/L (ref 135–145)
WBC # BLD AUTO: 8.6 THOU/MM3 (ref 4.8–10.8)

## 2023-05-29 ENCOUNTER — PATIENT MESSAGE (OUTPATIENT)
Dept: RHEUMATOLOGY | Age: 23
End: 2023-05-29

## 2023-05-30 DIAGNOSIS — F41.9 ANXIETY: ICD-10-CM

## 2023-05-30 DIAGNOSIS — F32.A DEPRESSION, UNSPECIFIED DEPRESSION TYPE: ICD-10-CM

## 2023-05-30 RX ORDER — ONDANSETRON 4 MG/1
4 TABLET, FILM COATED ORAL EVERY 8 HOURS PRN
Qty: 30 TABLET | Refills: 0 | Status: SHIPPED | OUTPATIENT
Start: 2023-05-30

## 2023-05-31 RX ORDER — BUPROPION HYDROCHLORIDE 150 MG/1
150 TABLET ORAL EVERY MORNING
Qty: 30 TABLET | Refills: 3 | Status: SHIPPED | OUTPATIENT
Start: 2023-05-31

## 2023-05-31 NOTE — TELEPHONE ENCOUNTER
This medication refill is regarding a electronic request. Refill requested by  [address] [place] [address] Requested Prescriptions     Pending Prescriptions Disp Refills    buPROPion (WELLBUTRIN XL) 150 MG extended release tablet [Pharmacy Med Name: BUPROPION XL 150MG TABLETS (24 H)] 30 tablet 3     Sig: TAKE 1 TABLET BY MOUTH EVERY MORNING     Date of last visit: 2/7/2023   Date of next visit: None  Date of last refill: 2/7/23 #30/3    Rx verified, ordered and set to EP.

## 2023-06-30 ENCOUNTER — NURSE ONLY (OUTPATIENT)
Dept: LAB | Age: 23
End: 2023-06-30

## 2023-06-30 DIAGNOSIS — M19.90 INFLAMMATORY ARTHRITIS: ICD-10-CM

## 2023-06-30 DIAGNOSIS — Z79.899 ENCOUNTER FOR LONG-TERM (CURRENT) USE OF HIGH-RISK MEDICATION: ICD-10-CM

## 2023-06-30 LAB
ALBUMIN SERPL BCG-MCNC: 4.2 G/DL (ref 3.5–5.1)
ALP SERPL-CCNC: 89 U/L (ref 38–126)
ALT SERPL W/O P-5'-P-CCNC: 12 U/L (ref 11–66)
ANION GAP SERPL CALC-SCNC: 12 MEQ/L (ref 8–16)
AST SERPL-CCNC: 14 U/L (ref 5–40)
BASOPHILS ABSOLUTE: 0.1 THOU/MM3 (ref 0–0.1)
BASOPHILS NFR BLD AUTO: 0.7 %
BILIRUB SERPL-MCNC: 0.3 MG/DL (ref 0.3–1.2)
BUN SERPL-MCNC: 11 MG/DL (ref 7–22)
CALCIUM SERPL-MCNC: 9 MG/DL (ref 8.5–10.5)
CHLORIDE SERPL-SCNC: 104 MEQ/L (ref 98–111)
CO2 SERPL-SCNC: 24 MEQ/L (ref 23–33)
CREAT SERPL-MCNC: 0.7 MG/DL (ref 0.4–1.2)
CRP SERPL-MCNC: 1.29 MG/DL (ref 0–1)
DEPRECATED RDW RBC AUTO: 44 FL (ref 35–45)
EOSINOPHIL NFR BLD AUTO: 1.5 %
EOSINOPHILS ABSOLUTE: 0.1 THOU/MM3 (ref 0–0.4)
ERYTHROCYTE [DISTWIDTH] IN BLOOD BY AUTOMATED COUNT: 13.2 % (ref 11.5–14.5)
ERYTHROCYTE [SEDIMENTATION RATE] IN BLOOD BY WESTERGREN METHOD: 24 MM/HR (ref 0–20)
GFR SERPL CREATININE-BSD FRML MDRD: > 60 ML/MIN/1.73M2
GLUCOSE SERPL-MCNC: 97 MG/DL (ref 70–108)
HCT VFR BLD AUTO: 41.6 % (ref 37–47)
HGB BLD-MCNC: 13.4 GM/DL (ref 12–16)
IMM GRANULOCYTES # BLD AUTO: 0.02 THOU/MM3 (ref 0–0.07)
IMM GRANULOCYTES NFR BLD AUTO: 0.2 %
LYMPHOCYTES ABSOLUTE: 1.7 THOU/MM3 (ref 1–4.8)
LYMPHOCYTES NFR BLD AUTO: 18.8 %
MCH RBC QN AUTO: 29.8 PG (ref 26–33)
MCHC RBC AUTO-ENTMCNC: 32.2 GM/DL (ref 32.2–35.5)
MCV RBC AUTO: 92.4 FL (ref 81–99)
MONOCYTES ABSOLUTE: 0.6 THOU/MM3 (ref 0.4–1.3)
MONOCYTES NFR BLD AUTO: 6.8 %
NEUTROPHILS NFR BLD AUTO: 72 %
NRBC BLD AUTO-RTO: 0 /100 WBC
PLATELET # BLD AUTO: 295 THOU/MM3 (ref 130–400)
PMV BLD AUTO: 10.7 FL (ref 9.4–12.4)
POTASSIUM SERPL-SCNC: 4.4 MEQ/L (ref 3.5–5.2)
PROT SERPL-MCNC: 7.4 G/DL (ref 6.1–8)
RBC # BLD AUTO: 4.5 MILL/MM3 (ref 4.2–5.4)
SEGMENTED NEUTROPHILS ABSOLUTE COUNT: 6.5 THOU/MM3 (ref 1.8–7.7)
SODIUM SERPL-SCNC: 140 MEQ/L (ref 135–145)
WBC # BLD AUTO: 9 THOU/MM3 (ref 4.8–10.8)

## 2023-07-19 ENCOUNTER — OFFICE VISIT (OUTPATIENT)
Dept: RHEUMATOLOGY | Age: 23
End: 2023-07-19
Payer: COMMERCIAL

## 2023-07-19 VITALS
DIASTOLIC BLOOD PRESSURE: 74 MMHG | OXYGEN SATURATION: 98 % | WEIGHT: 195.2 LBS | BODY MASS INDEX: 34.59 KG/M2 | SYSTOLIC BLOOD PRESSURE: 122 MMHG | HEIGHT: 63 IN | HEART RATE: 97 BPM

## 2023-07-19 DIAGNOSIS — Z79.899 ENCOUNTER FOR LONG-TERM (CURRENT) USE OF HIGH-RISK MEDICATION: ICD-10-CM

## 2023-07-19 DIAGNOSIS — M19.90 INFLAMMATORY ARTHRITIS: ICD-10-CM

## 2023-07-19 PROCEDURE — 99214 OFFICE O/P EST MOD 30 MIN: CPT | Performed by: INTERNAL MEDICINE

## 2023-07-19 PROCEDURE — G8427 DOCREV CUR MEDS BY ELIG CLIN: HCPCS | Performed by: INTERNAL MEDICINE

## 2023-07-19 PROCEDURE — 1036F TOBACCO NON-USER: CPT | Performed by: INTERNAL MEDICINE

## 2023-07-19 PROCEDURE — G8417 CALC BMI ABV UP PARAM F/U: HCPCS | Performed by: INTERNAL MEDICINE

## 2023-07-19 RX ORDER — HYDROXYCHLOROQUINE SULFATE 200 MG/1
200 TABLET, FILM COATED ORAL DAILY
Qty: 90 TABLET | Refills: 1 | Status: SHIPPED | OUTPATIENT
Start: 2023-07-19 | End: 2024-01-15

## 2023-07-19 RX ORDER — FOLIC ACID 1 MG/1
1 TABLET ORAL DAILY
Qty: 90 TABLET | Refills: 1 | Status: SHIPPED | OUTPATIENT
Start: 2023-07-19 | End: 2024-01-15

## 2023-07-19 ASSESSMENT — JOINT PAIN
TOTAL NUMBER OF SWOLLEN JOINTS: 1
TOTAL NUMBER OF TENDER JOINTS: 3

## 2023-07-19 ASSESSMENT — ENCOUNTER SYMPTOMS
ABDOMINAL PAIN: 0
VOMITING: 0
NAUSEA: 0
COUGH: 0
SHORTNESS OF BREATH: 0

## 2023-07-19 NOTE — PROGRESS NOTES
[address] [facility]   Rheumatology Clinic Note      7/19/2023         CHIEF COMPLAINT:    Chief Complaint   Patient presents with    Follow-up     3 month f/u Inflammatory arthritis    Other     Pt would like to change mtx to injection form. Pain feels about the same 5/10,. Lower back, Hands            HISTORY OF PRESENT ILLNESS:    20 y.o. female with polyarticular inflammatory arthritis presents for follow up. Presently, she is on methotrexate 20 mg PO weekly, folic acid 1 mg daily and hydroxychlorquine 200 mg daily (started last visit). Reports that methotrexate is helping her arthritis, but is not lasting to her next dose. She has good 4-5 days, then she starts experiencing pain, swelling and stiffness in her hands. Pain is most pronounced in her hands, knees and lower back. Morning stiffness lasts for 30 minutes. She is also concerned about tremors in her hands. Past Medical History:     has a past medical history of Allergic, Headache, IBS (irritable bowel syndrome), Inflammatory bowel disease, Scoliosis, Torticollis, and Vision abnormalities. Past Surgical History:     has a past surgical history that includes Adenoidectomy; Tonsillectomy; and Knee arthroscopy (Left).     Current Medications:      Current Outpatient Medications:     Methotrexate, PF, (RASUVO) 25 MG/0.5ML chemo injection pen, Inject 25 mg into the skin once a week, Disp: 4 Adjustable Dose Pre-filled Pen Syringe, Rfl: 1    hydroxychloroquine (PLAQUENIL) 200 MG tablet, Take 1 tablet by mouth daily, Disp: 90 tablet, Rfl: 1    folic acid (FOLVITE) 1 MG tablet, Take 1 tablet by mouth daily, Disp: 90 tablet, Rfl: 1    buPROPion (WELLBUTRIN XL) 150 MG extended release tablet, TAKE 1 TABLET BY MOUTH EVERY MORNING, Disp: 30 tablet, Rfl: 3    ondansetron (ZOFRAN) 4 MG tablet, Take 1 tablet by mouth every 8 hours as needed for Nausea or Vomiting, Disp: 30 tablet, Rfl: 0    celecoxib (CELEBREX) 200 MG capsule, Take 1 capsule by mouth

## 2023-07-21 DIAGNOSIS — Z79.899 ENCOUNTER FOR LONG-TERM (CURRENT) USE OF HIGH-RISK MEDICATION: ICD-10-CM

## 2023-07-21 DIAGNOSIS — M19.90 INFLAMMATORY ARTHRITIS: ICD-10-CM

## 2023-07-21 RX ORDER — HYDROXYCHLOROQUINE SULFATE 200 MG/1
200 TABLET, FILM COATED ORAL DAILY
Qty: 30 TABLET | OUTPATIENT
Start: 2023-07-21 | End: 2024-01-17

## 2023-07-27 ENCOUNTER — OFFICE VISIT (OUTPATIENT)
Dept: FAMILY MEDICINE CLINIC | Age: 23
End: 2023-07-27
Payer: COMMERCIAL

## 2023-07-27 VITALS
HEART RATE: 60 BPM | DIASTOLIC BLOOD PRESSURE: 66 MMHG | SYSTOLIC BLOOD PRESSURE: 124 MMHG | WEIGHT: 195.6 LBS | BODY MASS INDEX: 34.66 KG/M2 | RESPIRATION RATE: 16 BRPM

## 2023-07-27 DIAGNOSIS — G47.00 INSOMNIA, UNSPECIFIED TYPE: ICD-10-CM

## 2023-07-27 DIAGNOSIS — F32.A DEPRESSION, UNSPECIFIED DEPRESSION TYPE: ICD-10-CM

## 2023-07-27 DIAGNOSIS — F41.9 ANXIETY: ICD-10-CM

## 2023-07-27 PROCEDURE — 99213 OFFICE O/P EST LOW 20 MIN: CPT | Performed by: NURSE PRACTITIONER

## 2023-07-27 PROCEDURE — 1036F TOBACCO NON-USER: CPT | Performed by: NURSE PRACTITIONER

## 2023-07-27 PROCEDURE — G8427 DOCREV CUR MEDS BY ELIG CLIN: HCPCS | Performed by: NURSE PRACTITIONER

## 2023-07-27 PROCEDURE — G8417 CALC BMI ABV UP PARAM F/U: HCPCS | Performed by: NURSE PRACTITIONER

## 2023-07-27 RX ORDER — FLUOXETINE HYDROCHLORIDE 20 MG/1
20 CAPSULE ORAL DAILY
Qty: 90 CAPSULE | Refills: 3 | Status: SHIPPED | OUTPATIENT
Start: 2023-07-27

## 2023-07-27 RX ORDER — HYDROXYZINE HYDROCHLORIDE 25 MG/1
25 TABLET, FILM COATED ORAL NIGHTLY PRN
Qty: 90 TABLET | Refills: 0 | Status: SHIPPED | OUTPATIENT
Start: 2023-07-27 | End: 2023-10-25

## 2023-07-27 RX ORDER — FLUOXETINE HYDROCHLORIDE 40 MG/1
40 CAPSULE ORAL DAILY
Qty: 90 CAPSULE | Refills: 3 | Status: SHIPPED | OUTPATIENT
Start: 2023-07-27

## 2023-07-27 RX ORDER — BUPROPION HYDROCHLORIDE 300 MG/1
300 TABLET ORAL EVERY MORNING
Qty: 90 TABLET | Refills: 3 | Status: SHIPPED | OUTPATIENT
Start: 2023-07-27

## 2023-07-27 NOTE — PROGRESS NOTES
Current Outpatient Medications   Medication Sig Dispense Refill    buPROPion (WELLBUTRIN XL) 300 MG extended release tablet Take 1 tablet by mouth every morning 90 tablet 3    FLUoxetine (PROZAC) 40 MG capsule Take 1 capsule by mouth daily With 20 mg for total of 60 mg daily 90 capsule 3    FLUoxetine (PROZAC) 20 MG capsule Take 1 capsule by mouth daily With 40 mg for a total of 60 mg daily 90 capsule 3    hydrOXYzine HCl (ATARAX) 25 MG tablet Take 1 tablet by mouth nightly as needed for Anxiety 90 tablet 0    Methotrexate, PF, (RASUVO) 25 MG/0.5ML chemo injection pen Inject 25 mg into the skin once a week 4 Adjustable Dose Pre-filled Pen Syringe 1    hydroxychloroquine (PLAQUENIL) 200 MG tablet Take 1 tablet by mouth daily 90 tablet 1    folic acid (FOLVITE) 1 MG tablet Take 1 tablet by mouth daily 90 tablet 1    ondansetron (ZOFRAN) 4 MG tablet Take 1 tablet by mouth every 8 hours as needed for Nausea or Vomiting 30 tablet 0    celecoxib (CELEBREX) 200 MG capsule Take 1 capsule by mouth daily 60 capsule 3    Omega-3 1000 MG CAPS Take 4 capsules by mouth daily      turmeric 500 MG CAPS Take by mouth daily      Probiotic Product (PROBIOTIC-10 PO) Take 10 mg by mouth daily      Cholecalciferol (VITAMIN D3) 50 MCG (2000 UT) CAPS Take 2,000 capsules by mouth in the morning and at bedtime      b complex vitamins capsule Take 1 capsule by mouth daily      etonogestrel-ethinyl estradiol (NUVARING) 0.12-0.015 MG/24HR vaginal ring       acetaminophen (TYLENOL) 325 MG tablet Take 2 tablets by mouth every 6 hours as needed for Pain       No current facility-administered medications for this visit. Allergies   Allergen Reactions    Cefdinir Nausea Only       Subjective:    Review of Systems   Psychiatric/Behavioral:  Positive for dysphoric mood. The patient is nervous/anxious.       Objective:     Vitals:    07/27/23 1226   BP: 124/66   Pulse: 60   Resp: 16   Weight: 195 lb 9.6 oz (88.7 kg)       Body mass index is 34.66

## 2023-08-29 RX ORDER — METHOTREXATE 25 MG/.5ML
INJECTION, SOLUTION SUBCUTANEOUS
Qty: 2 ML | Refills: 3 | Status: SHIPPED | OUTPATIENT
Start: 2023-08-29

## 2023-08-29 NOTE — TELEPHONE ENCOUNTER
DOLV: 7/19/23  DONV: 9/19/23  LAST LAB DRAW: 6/30/23  LAST TB TEST: n/a    Lab Results   Component Value Date     06/30/2023    K 4.4 06/30/2023     06/30/2023    CO2 24 06/30/2023    BUN 11 06/30/2023    CREATININE 0.7 06/30/2023    GLUCOSE 97 06/30/2023    CALCIUM 9.0 06/30/2023    PROT 7.4 06/30/2023    LABALBU 4.2 06/30/2023    BILITOT 0.3 06/30/2023    ALKPHOS 89 06/30/2023    AST 14 06/30/2023    ALT 12 06/30/2023    LABGLOM >60 06/30/2023       No results for input(s): WBC, HGB, HCT, MCV, PLT in the last 720 hours.     Lab Results   Component Value Date    SEDRATE 24 (H) 06/30/2023       Lab Results   Component Value Date    CRP 1.29 (H) 06/30/2023

## 2023-09-06 ENCOUNTER — OFFICE VISIT (OUTPATIENT)
Dept: FAMILY MEDICINE CLINIC | Age: 23
End: 2023-09-06
Payer: COMMERCIAL

## 2023-09-06 VITALS
SYSTOLIC BLOOD PRESSURE: 120 MMHG | DIASTOLIC BLOOD PRESSURE: 84 MMHG | BODY MASS INDEX: 35.47 KG/M2 | HEIGHT: 63 IN | RESPIRATION RATE: 16 BRPM | WEIGHT: 200.2 LBS | HEART RATE: 88 BPM

## 2023-09-06 DIAGNOSIS — F32.A DEPRESSION, UNSPECIFIED DEPRESSION TYPE: ICD-10-CM

## 2023-09-06 DIAGNOSIS — F41.9 ANXIETY: ICD-10-CM

## 2023-09-06 DIAGNOSIS — Z00.00 WELL ADULT EXAM: Primary | ICD-10-CM

## 2023-09-06 PROCEDURE — 99395 PREV VISIT EST AGE 18-39: CPT | Performed by: NURSE PRACTITIONER

## 2023-09-07 ASSESSMENT — ENCOUNTER SYMPTOMS
BLOOD IN STOOL: 0
NAUSEA: 0
CONSTIPATION: 0
SHORTNESS OF BREATH: 0
VOMITING: 0
DIARRHEA: 0

## 2023-09-23 ENCOUNTER — NURSE ONLY (OUTPATIENT)
Dept: LAB | Age: 23
End: 2023-09-23

## 2023-09-23 DIAGNOSIS — Z00.00 WELL ADULT EXAM: ICD-10-CM

## 2023-09-23 DIAGNOSIS — Z79.899 ENCOUNTER FOR LONG-TERM (CURRENT) USE OF HIGH-RISK MEDICATION: ICD-10-CM

## 2023-09-23 DIAGNOSIS — M19.90 INFLAMMATORY ARTHRITIS: ICD-10-CM

## 2023-09-23 LAB
ALBUMIN SERPL BCG-MCNC: 4.1 G/DL (ref 3.5–5.1)
ALP SERPL-CCNC: 94 U/L (ref 38–126)
ALT SERPL W/O P-5'-P-CCNC: 27 U/L (ref 11–66)
ANION GAP SERPL CALC-SCNC: 9 MEQ/L (ref 8–16)
AST SERPL-CCNC: 19 U/L (ref 5–40)
BASOPHILS ABSOLUTE: 0.1 THOU/MM3 (ref 0–0.1)
BASOPHILS NFR BLD AUTO: 1.2 %
BILIRUB SERPL-MCNC: 0.3 MG/DL (ref 0.3–1.2)
BUN SERPL-MCNC: 21 MG/DL (ref 7–22)
CALCIUM SERPL-MCNC: 9 MG/DL (ref 8.5–10.5)
CHLORIDE SERPL-SCNC: 107 MEQ/L (ref 98–111)
CHOLEST SERPL-MCNC: 153 MG/DL (ref 100–199)
CHOLEST SERPL-MCNC: 158 MG/DL (ref 100–199)
CO2 SERPL-SCNC: 23 MEQ/L (ref 23–33)
CREAT SERPL-MCNC: 0.6 MG/DL (ref 0.4–1.2)
CRP SERPL-MCNC: 0.48 MG/DL (ref 0–1)
DEPRECATED RDW RBC AUTO: 45.1 FL (ref 35–45)
EOSINOPHIL NFR BLD AUTO: 4.1 %
EOSINOPHILS ABSOLUTE: 0.4 THOU/MM3 (ref 0–0.4)
ERYTHROCYTE [DISTWIDTH] IN BLOOD BY AUTOMATED COUNT: 13.3 % (ref 11.5–14.5)
ERYTHROCYTE [SEDIMENTATION RATE] IN BLOOD BY WESTERGREN METHOD: 30 MM/HR (ref 0–20)
FASTING: YES
GFR SERPL CREATININE-BSD FRML MDRD: > 60 ML/MIN/1.73M2
GLUCOSE SERPL-MCNC: 101 MG/DL (ref 70–108)
GLUCOSE SERPL-MCNC: 93 MG/DL (ref 70–108)
HCT VFR BLD AUTO: 42.2 % (ref 37–47)
HDLC SERPL-MCNC: 51 MG/DL
HDLC SERPL-MCNC: 51 MG/DL
HGB BLD-MCNC: 13.5 GM/DL (ref 12–16)
IMM GRANULOCYTES # BLD AUTO: 0.03 THOU/MM3 (ref 0–0.07)
IMM GRANULOCYTES NFR BLD AUTO: 0.3 %
LDLC SERPL CALC-MCNC: 82 MG/DL
LDLC SERPL CALC-MCNC: 86 MG/DL
LYMPHOCYTES ABSOLUTE: 1.7 THOU/MM3 (ref 1–4.8)
LYMPHOCYTES NFR BLD AUTO: 18 %
MCH RBC QN AUTO: 29.5 PG (ref 26–33)
MCHC RBC AUTO-ENTMCNC: 32 GM/DL (ref 32.2–35.5)
MCV RBC AUTO: 92.1 FL (ref 81–99)
MONOCYTES ABSOLUTE: 0.9 THOU/MM3 (ref 0.4–1.3)
MONOCYTES NFR BLD AUTO: 9.1 %
NEUTROPHILS NFR BLD AUTO: 67.3 %
NRBC BLD AUTO-RTO: 0 /100 WBC
PLATELET # BLD AUTO: 325 THOU/MM3 (ref 130–400)
PMV BLD AUTO: 10.9 FL (ref 9.4–12.4)
POTASSIUM SERPL-SCNC: 4.5 MEQ/L (ref 3.5–5.2)
PROT SERPL-MCNC: 6.9 G/DL (ref 6.1–8)
RBC # BLD AUTO: 4.58 MILL/MM3 (ref 4.2–5.4)
SEGMENTED NEUTROPHILS ABSOLUTE COUNT: 6.5 THOU/MM3 (ref 1.8–7.7)
SODIUM SERPL-SCNC: 139 MEQ/L (ref 135–145)
TRIGL SERPL-MCNC: 102 MG/DL (ref 0–199)
TRIGL SERPL-MCNC: 104 MG/DL (ref 0–199)
WBC # BLD AUTO: 9.7 THOU/MM3 (ref 4.8–10.8)

## 2023-10-02 DIAGNOSIS — F41.9 ANXIETY: ICD-10-CM

## 2023-10-02 DIAGNOSIS — G47.00 INSOMNIA, UNSPECIFIED TYPE: ICD-10-CM

## 2023-10-02 RX ORDER — HYDROXYZINE HYDROCHLORIDE 25 MG/1
TABLET, FILM COATED ORAL
Qty: 90 TABLET | Refills: 0 | Status: SHIPPED | OUTPATIENT
Start: 2023-10-02

## 2023-10-02 NOTE — TELEPHONE ENCOUNTER
Last visit- 9/6/2023  Next visit- Visit date not found    Requested Prescriptions     Pending Prescriptions Disp Refills    hydrOXYzine HCl (ATARAX) 25 MG tablet [Pharmacy Med Name: HYDROXYZINE HCL 25MG TABS] 90 tablet 0     Sig: TAKE ONE TABLET BY MOUTH EVERY NIGHT AS NEEDED FOR ANXIETY

## 2023-11-07 RX ORDER — METHOTREXATE 25 MG/.5ML
INJECTION, SOLUTION SUBCUTANEOUS
Qty: 2 ML | Refills: 3 | Status: SHIPPED | OUTPATIENT
Start: 2023-11-07

## 2023-11-15 ENCOUNTER — NURSE ONLY (OUTPATIENT)
Dept: LAB | Age: 23
End: 2023-11-15

## 2023-11-15 ENCOUNTER — OFFICE VISIT (OUTPATIENT)
Dept: RHEUMATOLOGY | Age: 23
End: 2023-11-15
Payer: COMMERCIAL

## 2023-11-15 ENCOUNTER — TELEPHONE (OUTPATIENT)
Dept: RHEUMATOLOGY | Age: 23
End: 2023-11-15

## 2023-11-15 VITALS
WEIGHT: 201 LBS | HEIGHT: 63 IN | BODY MASS INDEX: 35.61 KG/M2 | DIASTOLIC BLOOD PRESSURE: 70 MMHG | HEART RATE: 107 BPM | OXYGEN SATURATION: 97 % | SYSTOLIC BLOOD PRESSURE: 130 MMHG

## 2023-11-15 DIAGNOSIS — M19.90 INFLAMMATORY ARTHRITIS: ICD-10-CM

## 2023-11-15 DIAGNOSIS — Z11.59 NEED FOR HEPATITIS B SCREENING TEST: ICD-10-CM

## 2023-11-15 DIAGNOSIS — Z11.1 SCREENING-PULMONARY TB: ICD-10-CM

## 2023-11-15 DIAGNOSIS — Z79.899 ENCOUNTER FOR LONG-TERM (CURRENT) USE OF HIGH-RISK MEDICATION: ICD-10-CM

## 2023-11-15 DIAGNOSIS — M06.00 SERONEGATIVE RHEUMATOID ARTHRITIS (HCC): Primary | ICD-10-CM

## 2023-11-15 DIAGNOSIS — M06.00 SERONEGATIVE RHEUMATOID ARTHRITIS (HCC): ICD-10-CM

## 2023-11-15 LAB
ALBUMIN SERPL BCG-MCNC: 4.1 G/DL (ref 3.5–5.1)
ALP SERPL-CCNC: 94 U/L (ref 38–126)
ALT SERPL W/O P-5'-P-CCNC: 13 U/L (ref 11–66)
ANION GAP SERPL CALC-SCNC: 10 MEQ/L (ref 8–16)
AST SERPL-CCNC: 17 U/L (ref 5–40)
BASOPHILS ABSOLUTE: 0 THOU/MM3 (ref 0–0.1)
BASOPHILS NFR BLD AUTO: 0.7 %
BILIRUB SERPL-MCNC: 0.3 MG/DL (ref 0.3–1.2)
BUN SERPL-MCNC: 12 MG/DL (ref 7–22)
CALCIUM SERPL-MCNC: 9 MG/DL (ref 8.5–10.5)
CHLORIDE SERPL-SCNC: 102 MEQ/L (ref 98–111)
CO2 SERPL-SCNC: 23 MEQ/L (ref 23–33)
CREAT SERPL-MCNC: 0.7 MG/DL (ref 0.4–1.2)
CRP SERPL-MCNC: 0.36 MG/DL (ref 0–1)
DEPRECATED RDW RBC AUTO: 45.8 FL (ref 35–45)
EOSINOPHIL NFR BLD AUTO: 1.6 %
EOSINOPHILS ABSOLUTE: 0.1 THOU/MM3 (ref 0–0.4)
ERYTHROCYTE [DISTWIDTH] IN BLOOD BY AUTOMATED COUNT: 13.8 % (ref 11.5–14.5)
ERYTHROCYTE [SEDIMENTATION RATE] IN BLOOD BY WESTERGREN METHOD: 20 MM/HR (ref 0–20)
GFR SERPL CREATININE-BSD FRML MDRD: > 60 ML/MIN/1.73M2
GLUCOSE SERPL-MCNC: 92 MG/DL (ref 70–108)
HAV IGM SER QL: NEGATIVE
HBV CORE IGM SERPL QL IA: NEGATIVE
HBV SURFACE AG SERPL QL IA: NEGATIVE
HCT VFR BLD AUTO: 42.6 % (ref 37–47)
HCV IGG SERPL QL IA: NEGATIVE
HGB BLD-MCNC: 13.7 GM/DL (ref 12–16)
IMM GRANULOCYTES # BLD AUTO: 0.02 THOU/MM3 (ref 0–0.07)
IMM GRANULOCYTES NFR BLD AUTO: 0.3 %
LYMPHOCYTES ABSOLUTE: 1.3 THOU/MM3 (ref 1–4.8)
LYMPHOCYTES NFR BLD AUTO: 18.9 %
MCH RBC QN AUTO: 29.3 PG (ref 26–33)
MCHC RBC AUTO-ENTMCNC: 32.2 GM/DL (ref 32.2–35.5)
MCV RBC AUTO: 91 FL (ref 81–99)
MONOCYTES ABSOLUTE: 0.4 THOU/MM3 (ref 0.4–1.3)
MONOCYTES NFR BLD AUTO: 5.1 %
NEUTROPHILS NFR BLD AUTO: 73.4 %
NRBC BLD AUTO-RTO: 0 /100 WBC
PLATELET # BLD AUTO: 314 THOU/MM3 (ref 130–400)
PMV BLD AUTO: 10.6 FL (ref 9.4–12.4)
POTASSIUM SERPL-SCNC: 3.9 MEQ/L (ref 3.5–5.2)
PROT SERPL-MCNC: 7.4 G/DL (ref 6.1–8)
RBC # BLD AUTO: 4.68 MILL/MM3 (ref 4.2–5.4)
SEGMENTED NEUTROPHILS ABSOLUTE COUNT: 5.2 THOU/MM3 (ref 1.8–7.7)
SODIUM SERPL-SCNC: 135 MEQ/L (ref 135–145)
WBC # BLD AUTO: 7.1 THOU/MM3 (ref 4.8–10.8)

## 2023-11-15 PROCEDURE — G8427 DOCREV CUR MEDS BY ELIG CLIN: HCPCS | Performed by: INTERNAL MEDICINE

## 2023-11-15 PROCEDURE — G8484 FLU IMMUNIZE NO ADMIN: HCPCS | Performed by: INTERNAL MEDICINE

## 2023-11-15 PROCEDURE — 99214 OFFICE O/P EST MOD 30 MIN: CPT | Performed by: INTERNAL MEDICINE

## 2023-11-15 PROCEDURE — 1036F TOBACCO NON-USER: CPT | Performed by: INTERNAL MEDICINE

## 2023-11-15 PROCEDURE — G8417 CALC BMI ABV UP PARAM F/U: HCPCS | Performed by: INTERNAL MEDICINE

## 2023-11-15 ASSESSMENT — ENCOUNTER SYMPTOMS
ABDOMINAL PAIN: 0
NAUSEA: 0
SHORTNESS OF BREATH: 0
VOMITING: 0
COUGH: 0

## 2023-11-15 ASSESSMENT — JOINT PAIN
TOTAL NUMBER OF SWOLLEN JOINTS: 0
TOTAL NUMBER OF TENDER JOINTS: 4

## 2023-11-15 NOTE — PROGRESS NOTES
5 + 5 = 13.3 (MTX increased 25 mg on 7/19/23)     Remission: <3  Low Disease Activity: <6  Moderate Disease Activity: >=6 and <=12  High Disease Activity: >12      IMPRESSION/RECOMMENDATIONS:      1. Seronegative rheumatoid arthritis: Inflammatory arthritis, polyarticular symmetric, strong family h/o RA, she has positive CHIKIS. Partial improvement with high disease activity  -- discussed with pt treatment options. Will plan on starting Enbrel 50 mg SQ once weekly. Discussed with pt the benefits, risks and adverse effects of this medication. Patient expressed understanding.   -- assess QFT TB and hep B serologies  -- continue methotrexate 25 mg once weekly and switch to SQ rout. Continue folic acid 1 mg daily  -- continue Plaquenil 200 mg daily. 2. High risk med requiring close monitoring:  -- MTX: monitor CBC and CMP every 12 weeks. No recent labs.  Pt will get labs today    RTC in 2 months      Orders Placed This Encounter   Procedures    Quantiferon TB Gold     Standing Status:   Future     Standing Expiration Date:   5/15/2024    CBC with Auto Differential     Standing Status:   Future     Standing Expiration Date:   5/15/2024    Comprehensive Metabolic Panel     Standing Status:   Future     Standing Expiration Date:   5/15/2024    Sedimentation Rate     Standing Status:   Future     Standing Expiration Date:   5/15/2024    C-Reactive Protein     Standing Status:   Future     Standing Expiration Date:   5/15/2024    Hepatitis Panel, Acute     Standing Status:   Future     Standing Expiration Date:   5/15/2024        Vidhi Hubbard MD    [address]  [address]  [address] [place] [address]  [phone]

## 2023-11-15 NOTE — TELEPHONE ENCOUNTER
Plan for Enbrel once PA is obtained. Seronegative RA. On MTX SQ and PLQ.   Awaiting QFT TB and hep B serologies

## 2023-11-18 LAB
GAMMA INTERFERON BACKGROUND BLD IA-ACNC: 0.03 IU/ML
M TB IFN-G BLD-IMP: NEGATIVE
M TB IFN-G CD4+ BCKGRND COR BLD-ACNC: 0.02 IU/ML (ref 0–0.34)
M TB IFN-G CD4+CD8+ BCKGRND COR BLD-ACNC: 0.03 IU/ML (ref 0–0.34)
MITOGEN IGNF BCKGRD COR BLD-ACNC: 9.49 IU/ML

## 2023-11-20 RX ORDER — MEDROXYPROGESTERONE ACETATE 150 MG/ML
50 INJECTION, SUSPENSION INTRAMUSCULAR WEEKLY
Qty: 4 ML | Refills: 5 | Status: ACTIVE | OUTPATIENT
Start: 2023-11-20

## 2023-11-20 NOTE — TELEPHONE ENCOUNTER
QFT TB and Hep B serologies were negative. Enbrel was e-prescribed today 11/20/23. Will follow along the PA process.

## 2023-11-22 ENCOUNTER — TELEPHONE (OUTPATIENT)
Dept: INTERNAL MEDICINE | Age: 23
End: 2023-11-22

## 2023-11-22 ENCOUNTER — ENROLLMENT (OUTPATIENT)
Dept: INTERNAL MEDICINE | Age: 23
End: 2023-11-22

## 2023-12-01 ENCOUNTER — TELEPHONE (OUTPATIENT)
Dept: RHEUMATOLOGY | Age: 23
End: 2023-12-01

## 2023-12-01 ENCOUNTER — TELEPHONE (OUTPATIENT)
Dept: INTERNAL MEDICINE | Age: 23
End: 2023-12-01

## 2023-12-01 DIAGNOSIS — M06.00 SERONEGATIVE RHEUMATOID ARTHRITIS (HCC): Primary | ICD-10-CM

## 2023-12-01 NOTE — TELEPHONE ENCOUNTER
Specialty Medication Service    Date: 12/1/2023  Patient's Name: Phu Max YOB: 2000            _____________________________________________________________________________________________    Melanie Guillermo MD    Patient has been prescribed a medication currently on the [facility] ([facility]) formulary. Patient currently has [facility] pharmacy benefits and is required to enroll in the SMS program. A referral from your office is required to remain compliant with the SMS program. A referral has been pended for your convenience. If you have any questions, please feel free to contact our department at [phone] option 4.     Thank you for your collaboration,    [facility] Specialty Medication Service  Telephone: [phone] option 4   Fax: [phone]  Email: [email or web address]

## 2023-12-01 NOTE — TELEPHONE ENCOUNTER
Specialty Medication Service    Date: 12/1/2023  Patient's Name: Brian Hamilton YOB: 2000            _____________________________________________________________________________________________    Patient transferred from [facility] to schedule PharmD initial appointment for Specialty Medication Services. Patient scheduled 12/4/2023  Most recent office notes from Dr Shana Montesinos in patient chart.     Michaela Gaston, PharmD, [address] [facility] Specialty Medication Service  Phone: [phone] [address] [facility]  Phone: [phone] option [address] [facility] Only    Program: SMS  CPA in place:  No  Recommendation Provided To: Patient/Caregiver: 1 via Telephone  Intervention Detail: Scheduled Appointment  Intervention Accepted By: Patient/Caregiver: 1  Time Spent (min): 15

## 2023-12-04 ENCOUNTER — TELEPHONE (OUTPATIENT)
Dept: INTERNAL MEDICINE | Age: 23
End: 2023-12-04

## 2023-12-04 NOTE — TELEPHONE ENCOUNTER
Specialty Medication Service    Date: 12/4/2023  Patient's Name: Erik Murcia YOB: 2000            _____________________________________________________________________________________________    Left message to kevin Givens initial appointment for Specialty Medication Services. Please call: [phone] option 4. Will continue to outreach as appropriate.      Tali Leach PharmD [facility]  Ambulatory Clinical Pharmacist  Specialty Medication Services  Phone: [phone]  Fax: [phone]

## 2023-12-06 NOTE — TELEPHONE ENCOUNTER
Specialty Medication Service    Date: 12/6/2023  Patient's Name: Danish Franks YOB: 2000            _____________________________________________________________________________________________    Patient returned call to kevin Givens initial appointment for Specialty Medication Services. Patient scheduled 12/7/2023 at 10:30 AM.  Most recent office notes from Dr. Denny Ocampo in patient chart.     Gabriel Orta PharmD  Ambulatory Clinical Pharmacist   Specialty Medication Services   Phone: [phone] option 4  12/6/2023 11:55 AM    For Pharmacy Admin Tracking Only    Program: SMS  CPA in place:  No  Recommendation Provided To: Patient/Caregiver: 1 via Telephone  Intervention Detail: Scheduled Appointment  Intervention Accepted By: Patient/Caregiver: 1   Time Spent (min): 15

## 2023-12-06 NOTE — PROGRESS NOTES
capsule by mouth daily With 40 mg for a total of 60 mg daily 90 capsule 3    hydroxychloroquine (PLAQUENIL) 200 MG tablet Take 1 tablet by mouth daily 90 tablet 1    folic acid (FOLVITE) 1 MG tablet Take 1 tablet by mouth daily 90 tablet 1    ondansetron (ZOFRAN) 4 MG tablet Take 1 tablet by mouth every 8 hours as needed for Nausea or Vomiting 30 tablet 0    Omega-3 1000 MG CAPS Take 4 capsules by mouth daily      turmeric 500 MG CAPS Take 1 capsule by mouth daily      Probiotic Product (PROBIOTIC-10 PO) Take 10 mg by mouth daily      Cholecalciferol (VITAMIN D3) 50 MCG (2000 UT) CAPS Take 2,000 capsules by mouth in the morning and at bedtime      b complex vitamins capsule Take 1 capsule by mouth daily      etonogestrel-ethinyl estradiol (NUVARING) 0.12-0.015 MG/24HR vaginal ring Place 1 each vaginally See Admin Instructions      acetaminophen (TYLENOL) 325 MG tablet Take 2 tablets by mouth every 6 hours as needed for Pain       No current facility-administered medications for this visit. Current Specialty Medication: Etanercept (Enbrel)   Dose specific indication   Rheumatoid Arthritis: 50 mg once weekly or 25 mg twice weekly  Warnings to monitor for: Anaphylaxis/hypersensitivity reactions, Positive antinuclear antibody titers (even with negative baseline), Demyelinating CNS disease, Heart failure (Worsening and new-onset), pancytopenia and aplastic anemia, reactivation of hepatitis B (HBV), Infections: [US Boxed Warning], Malignancy: [US Boxed Warning], Tuberculosis: [US Boxed Warning]  Recommended monitoring: CBC with differential (baseline and then at discretion of provider); complete metabolic panel (baseline and then at discretion of provider); tuberculosis (TB) screening prior to initiating and yearly during therapy including risk factors. TB at discretion of provider for non high-risk patient. Yearly chest X-ray at discretion of provider if previously TB positive.   Hepatitis C virus/hepatitis B virus

## 2023-12-07 ENCOUNTER — PHARMACY VISIT (OUTPATIENT)
Dept: INTERNAL MEDICINE | Age: 23
End: 2023-12-07

## 2023-12-07 DIAGNOSIS — M06.9 RHEUMATOID ARTHRITIS, INVOLVING UNSPECIFIED SITE, UNSPECIFIED WHETHER RHEUMATOID FACTOR PRESENT (HCC): Primary | ICD-10-CM

## 2023-12-07 PROCEDURE — 99999 PR OFFICE/OUTPT VISIT,PROCEDURE ONLY: CPT | Performed by: PHARMACIST

## 2023-12-07 ASSESSMENT — PROMIS GLOBAL HEALTH SCALE
IN GENERAL, WOULD YOU SAY YOUR QUALITY OF LIFE IS...[ON A SCALE OF 1 (POOR) TO 5 (EXCELLENT)]: 3
IN THE PAST 7 DAYS, HOW WOULD YOU RATE YOUR PAIN ON AVERAGE [ON A SCALE FROM 0 (NO PAIN) TO 10 (WORST IMAGINABLE PAIN)]?: 5
SUM OF RESPONSES TO QUESTIONS 3, 6, 7, & 8: 16
TO WHAT EXTENT ARE YOU ABLE TO CARRY OUT YOUR EVERYDAY PHYSICAL ACTIVITIES SUCH AS WALKING, CLIMBING STAIRS, CARRYING GROCERIES, OR MOVING A CHAIR [ON A SCALE OF 1 (NOT AT ALL) TO 5 (COMPLETELY)]?: 4
IN GENERAL, HOW WOULD YOU RATE YOUR MENTAL HEALTH, INCLUDING YOUR MOOD AND YOUR ABILITY TO THINK [ON A SCALE OF 1 (POOR) TO 5 (EXCELLENT)]?: 3
IN GENERAL, HOW WOULD YOU RATE YOUR SATISFACTION WITH YOUR SOCIAL ACTIVITIES AND RELATIONSHIPS [ON A SCALE OF 1 (POOR) TO 5 (EXCELLENT)]?: 3
IN GENERAL, HOW WOULD YOU RATE YOUR PHYSICAL HEALTH [ON A SCALE OF 1 (POOR) TO 5 (EXCELLENT)]?: 3
IN THE PAST 7 DAYS, HOW OFTEN HAVE YOU BEEN BOTHERED BY EMOTIONAL PROBLEMS, SUCH AS FEELING ANXIOUS, DEPRESSED, OR IRRITABLE [ON A SCALE FROM 1 (NEVER) TO 5 (ALWAYS)]?: 4
IN GENERAL, PLEASE RATE HOW WELL YOU CARRY OUT YOUR USUAL SOCIAL ACTIVITIES (INCLUDES ACTIVITIES AT HOME, AT WORK, AND IN YOUR COMMUNITY, AND RESPONSIBILITIES AS A PARENT, CHILD, SPOUSE, EMPLOYEE, FRIEND, ETC) [ON A SCALE OF 1 (POOR) TO 5 (EXCELLENT)]?: 3
IN GENERAL, WOULD YOU SAY YOUR HEALTH IS...[ON A SCALE OF 1 (POOR) TO 5 (EXCELLENT)]: 3
SUM OF RESPONSES TO QUESTIONS 2, 4, 5, & 10: 13
IN THE PAST 7 DAYS, HOW WOULD YOU RATE YOUR FATIGUE ON AVERAGE [ON A SCALE FROM 1 (NONE) TO 5 (VERY SEVERE)]?: 4

## 2023-12-07 ASSESSMENT — ROUTINE ASSESSMENT OF PATIENT INDEX DATA (RAPID3)
TOTAL RAPID3 SCORE: 13.5
TOTAL RAPID3 SCORE: 10.5
ON A SCALE OF ONE TO TEN, CONSIDERING ALL THE WAYS IN WHICH ILLNESS AND HEALTH CONDITIONS MAY AFFECT YOU AT THIS TIME, PLEASE INDICATE BELOW HOW YOU ARE DOING:: 5
ON A SCALE OF ONE TO TEN, HOW DIFFICULT WAS IT FOR YOU TO COMPLETE THE LISTED DAILY PHYSICAL TASKS OVER THE LAST WEEK: 4.5
ON A SCALE OF ONE TO TEN, HOW MUCH PAIN HAVE YOU HAD BECAUSE OF YOUR CONDITION OVER THE PAST WEEK?: 5.5

## 2023-12-26 DIAGNOSIS — M19.90 INFLAMMATORY ARTHRITIS: ICD-10-CM

## 2023-12-26 DIAGNOSIS — G47.00 INSOMNIA, UNSPECIFIED TYPE: ICD-10-CM

## 2023-12-26 DIAGNOSIS — Z79.899 ENCOUNTER FOR LONG-TERM (CURRENT) USE OF HIGH-RISK MEDICATION: ICD-10-CM

## 2023-12-26 DIAGNOSIS — F41.9 ANXIETY: ICD-10-CM

## 2023-12-26 RX ORDER — HYDROXYZINE HYDROCHLORIDE 25 MG/1
TABLET, FILM COATED ORAL
Qty: 90 TABLET | Refills: 0 | Status: SHIPPED | OUTPATIENT
Start: 2023-12-26

## 2023-12-26 RX ORDER — FOLIC ACID 1 MG/1
1000 TABLET ORAL DAILY
Qty: 90 TABLET | Refills: 1 | Status: SHIPPED | OUTPATIENT
Start: 2023-12-26

## 2023-12-26 RX ORDER — HYDROXYCHLOROQUINE SULFATE 200 MG/1
200 TABLET, FILM COATED ORAL DAILY
Qty: 90 TABLET | Refills: 1 | Status: SHIPPED | OUTPATIENT
Start: 2023-12-26

## 2023-12-26 NOTE — TELEPHONE ENCOUNTER
This medication refill is regarding a electronic request. Refill requested by  [facility] . Requested Prescriptions     Pending Prescriptions Disp Refills    hydrOXYzine HCl (ATARAX) 25 MG tablet [Pharmacy Med Name: HYDROXYZINE HCL 25MG TABS] 90 tablet 0     Sig: TAKE ONE TABLET BY MOUTH EVERY NIGHT AS NEEDED FOR ANXIETY       Date of last visit: 9/6/2023   Date of next visit: Visit date not found  Date of last refill: 10/2/23  #90/0  Pharmacy Name: [facility] verified, ordered and set to EP.

## 2024-01-08 ENCOUNTER — TELEPHONE (OUTPATIENT)
Dept: INTERNAL MEDICINE | Age: 24
End: 2024-01-08

## 2024-01-08 NOTE — TELEPHONE ENCOUNTER
Specialty Medication Service    Date: 1/8/2024  Patient's Name: Gloria Bro YOB: 2000            _____________________________________________________________________________________________    Patient returned call to [facility] Medical Director  appointment for Specialty Medication Services. Patient scheduled 02/05/24 at 10 am.      Sintia Conte CPhT  Pharmacy   Specialty Medication Services   Phone: [phone] option 4    For Pharmacy Admin Tracking Only    Program: [facility]  CPA in place:  Yes  Recommendation Provided To: Patient/Caregiver: 1 via Telephone  Intervention Detail: Scheduled Appointment  Intervention Accepted By: Patient/Caregiver: 1  Gap Closed?:    Time Spent (min): 15

## 2024-01-08 NOTE — TELEPHONE ENCOUNTER
Specialty Medication Service    Date: 1/8/2024  Patient's Name: Gloria Bro YOB: 2000            _____________________________________________________________________________________________    Patient no showed scheduled Medical Director appointment for Specialty Medication Services. Please call: [phone] option 4. Left three messages to reschedule appointment. Will continue to outreach as appropriate.    David Gallegos, PharmD [facility]  Ambulatory Clinical Pharmacist  Specialty Medication Services  Phone: [phone]  Fax: [phone]    For Pharmacy Admin Tracking Only    Program: SMS  CPA in place:  No  Time Spent (min): 15

## 2024-01-11 ENCOUNTER — TELEPHONE (OUTPATIENT)
Dept: INTERNAL MEDICINE | Age: 24
End: 2024-01-11

## 2024-01-11 NOTE — TELEPHONE ENCOUNTER
Specialty Medication Service    Date: 1/11/2024  Patient's Name: Gloria Bro YOB: 2000            _____________________________________________________________________________________________    Email received from [facility] in regard to current SMS formulary medication, Enbrel. Initial SMS override placed. Patient scheduled with MD 02/05/24.    Sintia Conte CPhT  Pharmacy   Specialty Medication Services   Phone: [phone] option 4    For Pharmacy Admin Tracking Only    Program: SMS  CPA in place:  Yes  Recommendation Provided To: Pharmacy: 1  Intervention Detail: Benefit Assistance  Intervention Accepted By: Pharmacy: 1  Gap Closed?:    Time Spent (min): 15

## 2024-01-29 RX ORDER — METHOTREXATE 25 MG/.5ML
INJECTION, SOLUTION SUBCUTANEOUS
Qty: 2 ML | Refills: 0 | Status: SHIPPED | OUTPATIENT
Start: 2024-01-29 | End: 2024-01-30 | Stop reason: SDUPTHER

## 2024-01-29 NOTE — TELEPHONE ENCOUNTER
DOLV: 11/15/23  DONV: N/A (left message and sent mychart to reschedule)  LAST LAB DRAW: 11/15/23  LAST TB TEST: 11/15/23    Lab Results   Component Value Date     11/15/2023    K 3.9 11/15/2023     11/15/2023    CO2 23 11/15/2023    BUN 12 11/15/2023    CREATININE 0.7 11/15/2023    GLUCOSE 92 11/15/2023    CALCIUM 9.0 11/15/2023    PROT 7.4 11/15/2023    LABALBU 4.1 11/15/2023    BILITOT 0.3 11/15/2023    ALKPHOS 94 11/15/2023    AST 17 11/15/2023    ALT 13 11/15/2023    LABGLOM >60 11/15/2023       No results for input(s): \"WBC\", \"HGB\", \"HCT\", \"MCV\", \"PLT\" in the last 720 hours.    Lab Results   Component Value Date    SEDRATE 20 11/15/2023       Lab Results   Component Value Date    CRP 0.36 11/15/2023

## 2024-01-30 ENCOUNTER — OFFICE VISIT (OUTPATIENT)
Dept: RHEUMATOLOGY | Age: 24
End: 2024-01-30
Payer: COMMERCIAL

## 2024-01-30 VITALS
HEIGHT: 63 IN | BODY MASS INDEX: 37.88 KG/M2 | OXYGEN SATURATION: 98 % | WEIGHT: 213.8 LBS | SYSTOLIC BLOOD PRESSURE: 112 MMHG | HEART RATE: 96 BPM | DIASTOLIC BLOOD PRESSURE: 74 MMHG

## 2024-01-30 DIAGNOSIS — Z79.899 ENCOUNTER FOR LONG-TERM (CURRENT) USE OF HIGH-RISK MEDICATION: ICD-10-CM

## 2024-01-30 DIAGNOSIS — M06.00 SERONEGATIVE RHEUMATOID ARTHRITIS (HCC): Primary | ICD-10-CM

## 2024-01-30 PROCEDURE — 99214 OFFICE O/P EST MOD 30 MIN: CPT | Performed by: INTERNAL MEDICINE

## 2024-01-30 PROCEDURE — 1036F TOBACCO NON-USER: CPT | Performed by: INTERNAL MEDICINE

## 2024-01-30 PROCEDURE — G8427 DOCREV CUR MEDS BY ELIG CLIN: HCPCS | Performed by: INTERNAL MEDICINE

## 2024-01-30 PROCEDURE — G8417 CALC BMI ABV UP PARAM F/U: HCPCS | Performed by: INTERNAL MEDICINE

## 2024-01-30 PROCEDURE — G8484 FLU IMMUNIZE NO ADMIN: HCPCS | Performed by: INTERNAL MEDICINE

## 2024-01-30 RX ORDER — METHOTREXATE 25 MG/.5ML
INJECTION, SOLUTION SUBCUTANEOUS
Qty: 2 ML | Refills: 2 | Status: SHIPPED | OUTPATIENT
Start: 2024-01-30

## 2024-01-30 ASSESSMENT — ENCOUNTER SYMPTOMS
ABDOMINAL PAIN: 0
VOMITING: 0
NAUSEA: 0
SHORTNESS OF BREATH: 0
COUGH: 0

## 2024-01-30 ASSESSMENT — JOINT PAIN
TOTAL NUMBER OF TENDER JOINTS: 6
TOTAL NUMBER OF SWOLLEN JOINTS: 0

## 2024-01-30 NOTE — PROGRESS NOTES
distress.     Appearance: Normal appearance.   Eyes:      Conjunctiva/sclera: Conjunctivae normal.   Pulmonary:      Effort: Pulmonary effort is normal.   Musculoskeletal:         General: Tenderness present. No swelling. Normal range of motion.      Cervical back: Neck supple.   Skin:     General: Skin is warm and dry.      Findings: No rash.   Neurological:      General: No focal deficit present.      Mental Status: She is alert and oriented to person, place, and time.      Gait: Gait normal.   Psychiatric:         Mood and Affect: Mood normal.        Physical Exam        ONEAL-28 (ESR): 3.52 (Moderate disease activity)  ONEAL-28 (CRP): 2.94 (Low disease activity)         DATA:     Latest Reference Range & Units 11/15/23 10:43   Sodium 135 - 145 meq/L 135   Potassium 3.5 - 5.2 meq/L 3.9   Chloride 98 - 111 meq/L 102   CO2 23 - 33 meq/L 23   BUN,BUNPL 7 - 22 mg/dL 12   Creatinine 0.4 - 1.2 mg/dL 0.7   Anion Gap 8.0 - 16.0 meq/L 10.0   Est, Glom Filt Rate >60 ml/min/1.73m2 >60   Glucose, Random 70 - 108 mg/dL 92   CALCIUM, SERUM, 373916 8.5 - 10.5 mg/dL 9.0   Total Protein 6.1 - 8.0 g/dL 7.4   CRP 0.00 - 1.00 mg/dl 0.36   Albumin 3.5 - 5.1 g/dL 4.1   Alk Phos 38 - 126 U/L 94   ALT 11 - 66 U/L 13   AST 5 - 40 U/L 17   BILIRUBIN TOTAL 0.3 - 1.2 mg/dL 0.3      Latest Reference Range & Units 11/15/23 10:44   WBC 4.8 - 10.8 thou/mm3 7.1   RBC 4.20 - 5.40 mill/mm3 4.68   Hemoglobin Quant 12.0 - 16.0 gm/dl 13.7   Hematocrit 37.0 - 47.0 % 42.6   MCV 81.0 - 99.0 fL 91.0   MCH 26.0 - 33.0 pg 29.3   MCHC 32.2 - 35.5 gm/dl 32.2   MPV 9.4 - 12.4 fL 10.6   RDW-CV 11.5 - 14.5 % 13.8   RDW-SD 35.0 - 45.0 fL 45.8 (H)   Platelet Count 130 - 400 thou/mm3 314      Latest Reference Range & Units 11/15/23 10:43   Sed Rate 0 - 20 mm/hr 20           AVISE CTD 1/20/2023:            RAPID3 Composite Score = MDHAQ (0-10) + Patient pain VAS (0-10): + Patient global assessment VAS (0-10):     3/2/2023 --- RAPID 3: 1.3 + 6.5 + 3 = 10.8   4/10/23

## 2024-02-01 ENCOUNTER — NURSE ONLY (OUTPATIENT)
Dept: LAB | Age: 24
End: 2024-02-01

## 2024-02-05 ENCOUNTER — PHARMACY VISIT (OUTPATIENT)
Dept: INTERNAL MEDICINE | Age: 24
End: 2024-02-05

## 2024-02-05 DIAGNOSIS — M06.00 SERONEGATIVE RHEUMATOID ARTHRITIS (HCC): Primary | ICD-10-CM

## 2024-02-05 RX ORDER — MEDROXYPROGESTERONE ACETATE 150 MG/ML
50 INJECTION, SUSPENSION INTRAMUSCULAR WEEKLY
Qty: 4 ML | Refills: 6 | Status: SHIPPED | OUTPATIENT
Start: 2024-02-05

## 2024-02-05 NOTE — PROGRESS NOTES
capsule Take 1 capsule by mouth daily      etonogestrel-ethinyl estradiol (NUVARING) 0.12-0.015 MG/24HR vaginal ring Place 1 each vaginally See Admin Instructions      acetaminophen (TYLENOL) 325 MG tablet Take 2 tablets by mouth every 6 hours as needed for Pain       No current facility-administered medications for this visit.       Social History     Tobacco Use    Smoking status: Never    Smokeless tobacco: Never   Vaping Use    Vaping Use: Never used   Substance Use Topics    Alcohol use: No    Drug use: No       Family History   Problem Relation Age of Onset    Arthritis Mother     High Blood Pressure Mother     Anxiety Disorder Father     Asthma Sister     Thyroid Disease Maternal Grandmother     Cancer Paternal Grandfather         Thyroid            PHYSICAL EXAM:  There were no vitals filed for this visit.  BP Readings from Last 3 Encounters:   01/30/24 112/74   11/15/23 130/70   09/06/23 120/84          ASSESSMENT AND PLAN:  Diagnoses and all orders for this visit:    Seronegative rheumatoid arthritis (HCC)     continue Enbrel 50 mg SQ once weekly as prescribed    Given the patient's condition, the patient should continue with the current care provided by the pharmacist.    FOLLOW UP AND INSTRUCTIONS:  Follow up with Nain Castro received counseling on the following healthy behaviors: medication adherence    Discussed use, benefit, and side effects of prescribed medications.  Barriers to medication compliance addressed.  All patient questions answered.  Pt voiced understanding.    Tamra Lopez MD  Co-Director [facility] pharmacy program  Faculty Internal Medicine    [facility]/Pharmacy Program  [facility]    2/5/2024, 9:46 AM

## 2024-02-28 DIAGNOSIS — M19.90 INFLAMMATORY ARTHRITIS: ICD-10-CM

## 2024-02-28 DIAGNOSIS — Z79.899 ENCOUNTER FOR LONG-TERM (CURRENT) USE OF HIGH-RISK MEDICATION: ICD-10-CM

## 2024-02-28 RX ORDER — FOLIC ACID 1 MG/1
1000 TABLET ORAL DAILY
Qty: 90 TABLET | Refills: 1 | Status: SHIPPED | OUTPATIENT
Start: 2024-02-28

## 2024-03-05 LAB — CYTOLOGY THIN PREP PAP: NORMAL

## 2024-03-18 ENCOUNTER — OFFICE VISIT (OUTPATIENT)
Dept: FAMILY MEDICINE CLINIC | Age: 24
End: 2024-03-18
Payer: COMMERCIAL

## 2024-03-18 VITALS
DIASTOLIC BLOOD PRESSURE: 74 MMHG | BODY MASS INDEX: 38.63 KG/M2 | SYSTOLIC BLOOD PRESSURE: 122 MMHG | WEIGHT: 218 LBS | TEMPERATURE: 98.1 F | RESPIRATION RATE: 16 BRPM | HEART RATE: 76 BPM

## 2024-03-18 DIAGNOSIS — E66.09 CLASS 2 OBESITY DUE TO EXCESS CALORIES WITHOUT SERIOUS COMORBIDITY WITH BODY MASS INDEX (BMI) OF 38.0 TO 38.9 IN ADULT: ICD-10-CM

## 2024-03-18 DIAGNOSIS — Z76.89 ENCOUNTER FOR WEIGHT MANAGEMENT: Primary | ICD-10-CM

## 2024-03-18 PROCEDURE — 99213 OFFICE O/P EST LOW 20 MIN: CPT | Performed by: NURSE PRACTITIONER

## 2024-03-18 RX ORDER — PHENTERMINE HYDROCHLORIDE 37.5 MG/1
37.5 TABLET ORAL
Qty: 30 TABLET | Refills: 0 | Status: SHIPPED | OUTPATIENT
Start: 2024-03-18 | End: 2024-04-17

## 2024-03-18 SDOH — ECONOMIC STABILITY: HOUSING INSECURITY
IN THE LAST 12 MONTHS, WAS THERE A TIME WHEN YOU DID NOT HAVE A STEADY PLACE TO SLEEP OR SLEPT IN A SHELTER (INCLUDING NOW)?: NO

## 2024-03-18 SDOH — ECONOMIC STABILITY: FOOD INSECURITY: WITHIN THE PAST 12 MONTHS, YOU WORRIED THAT YOUR FOOD WOULD RUN OUT BEFORE YOU GOT MONEY TO BUY MORE.: NEVER TRUE

## 2024-03-18 SDOH — ECONOMIC STABILITY: FOOD INSECURITY: WITHIN THE PAST 12 MONTHS, THE FOOD YOU BOUGHT JUST DIDN'T LAST AND YOU DIDN'T HAVE MONEY TO GET MORE.: NEVER TRUE

## 2024-03-18 SDOH — ECONOMIC STABILITY: INCOME INSECURITY: HOW HARD IS IT FOR YOU TO PAY FOR THE VERY BASICS LIKE FOOD, HOUSING, MEDICAL CARE, AND HEATING?: NOT HARD AT ALL

## 2024-03-18 ASSESSMENT — PATIENT HEALTH QUESTIONNAIRE - PHQ9
SUM OF ALL RESPONSES TO PHQ QUESTIONS 1-9: 0
6. FEELING BAD ABOUT YOURSELF - OR THAT YOU ARE A FAILURE OR HAVE LET YOURSELF OR YOUR FAMILY DOWN: NOT AT ALL
SUM OF ALL RESPONSES TO PHQ QUESTIONS 1-9: 0
8. MOVING OR SPEAKING SO SLOWLY THAT OTHER PEOPLE COULD HAVE NOTICED. OR THE OPPOSITE, BEING SO FIGETY OR RESTLESS THAT YOU HAVE BEEN MOVING AROUND A LOT MORE THAN USUAL: NOT AT ALL
SUM OF ALL RESPONSES TO PHQ QUESTIONS 1-9: 0
10. IF YOU CHECKED OFF ANY PROBLEMS, HOW DIFFICULT HAVE THESE PROBLEMS MADE IT FOR YOU TO DO YOUR WORK, TAKE CARE OF THINGS AT HOME, OR GET ALONG WITH OTHER PEOPLE: NOT DIFFICULT AT ALL
2. FEELING DOWN, DEPRESSED OR HOPELESS: NOT AT ALL
1. LITTLE INTEREST OR PLEASURE IN DOING THINGS: NOT AT ALL
SUM OF ALL RESPONSES TO PHQ9 QUESTIONS 1 & 2: 0
5. POOR APPETITE OR OVEREATING: NOT AT ALL
9. THOUGHTS THAT YOU WOULD BE BETTER OFF DEAD, OR OF HURTING YOURSELF: NOT AT ALL
3. TROUBLE FALLING OR STAYING ASLEEP: NOT AT ALL
SUM OF ALL RESPONSES TO PHQ QUESTIONS 1-9: 0
4. FEELING TIRED OR HAVING LITTLE ENERGY: NOT AT ALL
7. TROUBLE CONCENTRATING ON THINGS, SUCH AS READING THE NEWSPAPER OR WATCHING TELEVISION: NOT AT ALL

## 2024-03-18 ASSESSMENT — ENCOUNTER SYMPTOMS
NAUSEA: 0
BLOOD IN STOOL: 0
VOMITING: 0
CONSTIPATION: 0
SHORTNESS OF BREATH: 0
DIARRHEA: 0

## 2024-03-18 NOTE — PROGRESS NOTES
[facility]  [address]  [facility] [address]  Dept: [phone]  Dept Fax: [phone]  SUBJECTIVE     Chief Complaint   Patient presents with    Discuss Medications     Patient would like to discuss starting Adipex.        Gloria Bro is a23 y.o.female    Pt is interested in starting Adipex for weight loss.  Her mom and her sister are currently taking it and doing well.  Patient has never been on weight loss medications before but is struggling with losing weight on her own.  Currently denies any chest pain or shortness of breath.    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   Respiratory:  Negative for shortness of breath.    Cardiovascular:  Negative for chest pain, palpitations and leg swelling.   Gastrointestinal:  Negative for blood in stool, constipation, diarrhea, nausea and vomiting.   Genitourinary:  Negative for dysuria and hematuria.   Musculoskeletal:  Negative for myalgias.   Neurological:  Negative for dizziness and headaches.   All other systems reviewed and are negative.          OBJECTIVE     /74 (Site: Right Upper Arm)   Pulse 76   Temp 98.1 °F (36.7 °C) (Oral)   Resp 16   Wt 98.9 kg (218 lb)   BMI 38.63 kg/m²     Wt Readings from Last 3 Encounters:   03/18/24 98.9 kg (218 lb)   01/30/24 97 kg (213 lb 12.8 oz)   11/15/23 91.2 kg (201 lb)     Body mass index is 38.63 kg/m².      Physical Exam  Vitals and nursing note reviewed.   Constitutional:       Appearance: She is well-developed. She is obese.   HENT:      Head: Normocephalic and atraumatic.      Right Ear: External ear normal.      Left Ear: External ear normal.      Nose: Nose normal.   Eyes:      Conjunctiva/sclera: Conjunctivae normal.      Pupils: Pupils are equal, round, and reactive to light.   Cardiovascular:      Rate and Rhythm: Normal

## 2024-03-26 DIAGNOSIS — G47.00 INSOMNIA, UNSPECIFIED TYPE: ICD-10-CM

## 2024-03-26 DIAGNOSIS — F41.9 ANXIETY: ICD-10-CM

## 2024-03-26 RX ORDER — HYDROXYZINE HYDROCHLORIDE 25 MG/1
TABLET, FILM COATED ORAL
Qty: 90 TABLET | Refills: 0 | Status: SHIPPED | OUTPATIENT
Start: 2024-03-26

## 2024-03-26 NOTE — TELEPHONE ENCOUNTER
This medication refill is regarding a electronic request. Refill requested by patient.    Requested Prescriptions     Pending Prescriptions Disp Refills    hydrOXYzine HCl (ATARAX) 25 MG tablet [Pharmacy Med Name: HYDROXYZINE HCL 25MG TABS] 90 tablet 0     Sig: TAKE ONE TABLET BY MOUTH EVERY NIGHT AS NEEDED FOR ANXIETY       Date of last visit: 3/18/2024   Date of next visit: Visit date not found    Rx verified, ordered and set to EP.

## 2024-04-17 ENCOUNTER — TELEPHONE (OUTPATIENT)
Dept: RHEUMATOLOGY | Age: 24
End: 2024-04-17

## 2024-04-17 NOTE — TELEPHONE ENCOUNTER
Received folic acid refill request from [facility] pharmacy. Folic acid script was sent to [facility] pharmacy for 90 day supply with 1 refill on 2/28/24. Left message for patient to verify if needs to go to [facility]. [employer] message also sent.

## 2024-04-19 ENCOUNTER — OFFICE VISIT (OUTPATIENT)
Dept: FAMILY MEDICINE CLINIC | Age: 24
End: 2024-04-19

## 2024-04-19 VITALS
BODY MASS INDEX: 36.75 KG/M2 | SYSTOLIC BLOOD PRESSURE: 120 MMHG | RESPIRATION RATE: 16 BRPM | HEART RATE: 84 BPM | WEIGHT: 207.4 LBS | DIASTOLIC BLOOD PRESSURE: 78 MMHG | TEMPERATURE: 97.3 F

## 2024-04-19 DIAGNOSIS — Z76.89 ENCOUNTER FOR WEIGHT MANAGEMENT: Primary | ICD-10-CM

## 2024-04-19 RX ORDER — PHENTERMINE HYDROCHLORIDE 37.5 MG/1
37.5 CAPSULE ORAL EVERY MORNING
Qty: 30 CAPSULE | Refills: 0 | Status: SHIPPED | OUTPATIENT
Start: 2024-04-19 | End: 2024-05-19

## 2024-04-19 RX ORDER — PHENTERMINE HYDROCHLORIDE 37.5 MG/1
37.5 CAPSULE ORAL EVERY MORNING
COMMUNITY
End: 2024-04-19 | Stop reason: SDUPTHER

## 2024-04-19 NOTE — PROGRESS NOTES
Cardiovascular:      Rate and Rhythm: Normal rate and regular rhythm.      Heart sounds: Normal heart sounds.   Pulmonary:      Effort: Pulmonary effort is normal.      Breath sounds: Normal breath sounds.   Abdominal:      General: Bowel sounds are normal.      Palpations: Abdomen is soft.   Musculoskeletal:         General: Normal range of motion.      Cervical back: Normal range of motion and neck supple.   Skin:     General: Skin is warm and dry.   Neurological:      Mental Status: She is alert and oriented to person, place, and time.      Deep Tendon Reflexes: Reflexes are normal and symmetric.   Psychiatric:         Behavior: Behavior normal.         Thought Content: Thought content normal.         Judgment: Judgment normal.               ASSESSMENT       Diagnosis Orders   1. Encounter for weight management  phentermine (ADIPEX-P) 37.5 MG capsule      2. BMI 36.0-36.9,adult  phentermine (ADIPEX-P) 37.5 MG capsule          PLAN      Adipex sent to pharmacy  Continue to work on diet, exercise (30 minutes, 5x/week), and weight loss for optimal cardiovascular health  Follow up in 30 days     Electronically signed by ARSH Granger CNP on 4/21/2024 at 9:20 PM      Controlled Substance Monitoring:    Acute and Chronic Pain Monitoring:   RX Monitoring Periodic Controlled Substance Monitoring   4/19/2024  11:53 AM No signs of potential drug abuse or diversion identified.

## 2024-04-23 ENCOUNTER — NURSE ONLY (OUTPATIENT)
Dept: LAB | Age: 24
End: 2024-04-23

## 2024-04-23 DIAGNOSIS — M06.00 SERONEGATIVE RHEUMATOID ARTHRITIS (HCC): ICD-10-CM

## 2024-04-23 DIAGNOSIS — Z79.899 ENCOUNTER FOR LONG-TERM (CURRENT) USE OF HIGH-RISK MEDICATION: ICD-10-CM

## 2024-04-23 LAB
ALBUMIN SERPL BCG-MCNC: 4.1 G/DL (ref 3.5–5.1)
ALP SERPL-CCNC: 98 U/L (ref 38–126)
ALT SERPL W/O P-5'-P-CCNC: 14 U/L (ref 11–66)
ANION GAP SERPL CALC-SCNC: 14 MEQ/L (ref 8–16)
AST SERPL-CCNC: 15 U/L (ref 5–40)
BASOPHILS ABSOLUTE: 0.1 THOU/MM3 (ref 0–0.1)
BASOPHILS NFR BLD AUTO: 0.8 %
BILIRUB SERPL-MCNC: 0.3 MG/DL (ref 0.3–1.2)
BUN SERPL-MCNC: 16 MG/DL (ref 7–22)
CALCIUM SERPL-MCNC: 8.6 MG/DL (ref 8.5–10.5)
CHLORIDE SERPL-SCNC: 105 MEQ/L (ref 98–111)
CO2 SERPL-SCNC: 21 MEQ/L (ref 23–33)
CREAT SERPL-MCNC: 0.8 MG/DL (ref 0.4–1.2)
CRP SERPL-MCNC: 0.4 MG/DL (ref 0–1)
DEPRECATED RDW RBC AUTO: 44.8 FL (ref 35–45)
EOSINOPHIL NFR BLD AUTO: 1.8 %
EOSINOPHILS ABSOLUTE: 0.1 THOU/MM3 (ref 0–0.4)
ERYTHROCYTE [DISTWIDTH] IN BLOOD BY AUTOMATED COUNT: 13.7 % (ref 11.5–14.5)
ERYTHROCYTE [SEDIMENTATION RATE] IN BLOOD BY WESTERGREN METHOD: 14 MM/HR (ref 0–20)
GFR SERPL CREATININE-BSD FRML MDRD: > 90 ML/MIN/1.73M2
GLUCOSE SERPL-MCNC: 104 MG/DL (ref 70–108)
HCT VFR BLD AUTO: 42.1 % (ref 37–47)
HGB BLD-MCNC: 13.7 GM/DL (ref 12–16)
IMM GRANULOCYTES # BLD AUTO: 0.02 THOU/MM3 (ref 0–0.07)
IMM GRANULOCYTES NFR BLD AUTO: 0.3 %
LYMPHOCYTES ABSOLUTE: 1.7 THOU/MM3 (ref 1–4.8)
LYMPHOCYTES NFR BLD AUTO: 23 %
MCH RBC QN AUTO: 29.1 PG (ref 26–33)
MCHC RBC AUTO-ENTMCNC: 32.5 GM/DL (ref 32.2–35.5)
MCV RBC AUTO: 89.6 FL (ref 81–99)
MONOCYTES ABSOLUTE: 0.6 THOU/MM3 (ref 0.4–1.3)
MONOCYTES NFR BLD AUTO: 8 %
NEUTROPHILS NFR BLD AUTO: 66.1 %
NRBC BLD AUTO-RTO: 0 /100 WBC
PLATELET # BLD AUTO: 316 THOU/MM3 (ref 130–400)
PMV BLD AUTO: 11.3 FL (ref 9.4–12.4)
POTASSIUM SERPL-SCNC: 4.1 MEQ/L (ref 3.5–5.2)
PROT SERPL-MCNC: 7.5 G/DL (ref 6.1–8)
RBC # BLD AUTO: 4.7 MILL/MM3 (ref 4.2–5.4)
SEGMENTED NEUTROPHILS ABSOLUTE COUNT: 4.8 THOU/MM3 (ref 1.8–7.7)
SODIUM SERPL-SCNC: 140 MEQ/L (ref 135–145)
WBC # BLD AUTO: 7.2 THOU/MM3 (ref 4.8–10.8)

## 2024-04-24 ENCOUNTER — TELEPHONE (OUTPATIENT)
Dept: INTERNAL MEDICINE | Age: 24
End: 2024-04-24

## 2024-04-24 NOTE — TELEPHONE ENCOUNTER
Specialty Medication Service    Date: 4/24/2024  Patient's Name: Gloria Bro YOB: 2000            _____________________________________________________________________________________________    PA for patient's Enbrel submitted to payer for approval. [facility] key: [id number]. Will continue to monitor for PA determination.     Braydon Miranda, PharmD, [facility]  Ambulatory Clinical Pharmacist   [facility] Specialty Medication Service  Phone: [phone]  [facility]  Phone: [phone] option 1    For Pharmacy Admin Tracking Only    Program: SMS  CPA in place:  Yes  Time Spent (min): 5

## 2024-04-25 ENCOUNTER — TELEPHONE (OUTPATIENT)
Dept: INTERNAL MEDICINE | Age: 24
End: 2024-04-25

## 2024-04-25 NOTE — TELEPHONE ENCOUNTER
Specialty Medication Service    Date: 4/25/2024  Patient's Name: Gloria Bro YOB: 2000            _____________________________________________________________________________________________    PA for patient's Enbrel submitted to [facility] for approval. [facility] key: [id number]. Will continue to monitor for PA determination.     David Gallegos, PharmD [facility]  Ambulatory Clinical Pharmacist  Specialty Medication Services  Phone: [phone]  Fax: [phone]

## 2024-04-29 NOTE — TELEPHONE ENCOUNTER
Specialty Medication Service    Date: 4/29/2024  Patient's Name: Gloria Bro YOB: 2000            _____________________________________________________________________________________________    Patient's PA for Enbrel has been approved through 05/20/24 - 05/19/25. Approval letter uploaded in Media tab    Kamila Solis CPhT  Clinical   Specialty Medication Services  Phone: [phone] option #4  Fax: [phone]      For Pharmacy Admin Tracking Only    Program: SMS  CPA in place:  Yes  Recommendation Provided To: Other: 1  Intervention Detail: Benefit Assistance  Intervention Accepted By: Other: 1   Time Spent (min): 15

## 2024-04-30 ENCOUNTER — OFFICE VISIT (OUTPATIENT)
Dept: RHEUMATOLOGY | Age: 24
End: 2024-04-30
Payer: COMMERCIAL

## 2024-04-30 VITALS
HEIGHT: 63 IN | OXYGEN SATURATION: 98 % | BODY MASS INDEX: 36.76 KG/M2 | WEIGHT: 207.45 LBS | DIASTOLIC BLOOD PRESSURE: 74 MMHG | HEART RATE: 98 BPM | SYSTOLIC BLOOD PRESSURE: 122 MMHG

## 2024-04-30 DIAGNOSIS — Z79.899 ENCOUNTER FOR LONG-TERM (CURRENT) USE OF HIGH-RISK MEDICATION: ICD-10-CM

## 2024-04-30 DIAGNOSIS — M06.00 SERONEGATIVE RHEUMATOID ARTHRITIS (HCC): ICD-10-CM

## 2024-04-30 DIAGNOSIS — M19.90 INFLAMMATORY ARTHRITIS: Primary | ICD-10-CM

## 2024-04-30 PROCEDURE — 99214 OFFICE O/P EST MOD 30 MIN: CPT | Performed by: INTERNAL MEDICINE

## 2024-04-30 RX ORDER — METHOTREXATE 25 MG/.5ML
INJECTION, SOLUTION SUBCUTANEOUS
Qty: 2 ML | Refills: 2 | Status: SHIPPED | OUTPATIENT
Start: 2024-04-30

## 2024-04-30 ASSESSMENT — ENCOUNTER SYMPTOMS
COUGH: 0
SHORTNESS OF BREATH: 0
ABDOMINAL PAIN: 0
VOMITING: 0
NAUSEA: 0

## 2024-04-30 NOTE — PROGRESS NOTES
[facility]   Rheumatology Clinic Note      4/30/2024         CHIEF COMPLAINT:    Chief Complaint   Patient presents with    3 Month Follow-Up     Seronegative rheumatoid arthritis (HCC)    Joint Pain     Bilateral hand, knees and shoulders  Pain level 5           HISTORY OF PRESENT ILLNESS:    23 y.o. female with polyarticular seronegative inflammatory arthritis (+CHIKIS, -RF, -CCP) presents for follow up. Presently, she is on methotrexate 25 mg SQ weekly, folic acid 1 mg daily, and Enbrel 50 mg SQ once weekly (started early Dec 2023).  Past meds: hydroxychloroquine    Reports improvement in her joint pain with the combination of methotrexate and Enbrel.   Has episodes of pain in her hands, shoulders and right knee. Tolerable to the most part. No significant joint swelling. Morning stiffness lasts for 20 minutes.        Past Medical History:     has a past medical history of Allergic, Anxiety, Depression, Headache, IBS (irritable bowel syndrome), Inflammatory bowel disease, Insomnia, Rheumatoid arthritis (HCC), Scoliosis, Torticollis, and Vision abnormalities.    Past Surgical History:     has a past surgical history that includes Adenoidectomy; Tonsillectomy; and Knee arthroscopy (Left).    Current Medications:      Current Outpatient Medications:     phentermine (ADIPEX-P) 37.5 MG capsule, Take 1 capsule by mouth every morning for 30 days. Max Daily Amount: 37.5 mg, Disp: 30 capsule, Rfl: 0    hydrOXYzine HCl (ATARAX) 25 MG tablet, TAKE ONE TABLET BY MOUTH EVERY NIGHT AS NEEDED FOR ANXIETY, Disp: 90 tablet, Rfl: 0    folic acid (FOLVITE) 1 MG tablet, Take 1 tablet by mouth daily, Disp: 90 tablet, Rfl: 1    Etanercept (ENBREL SURECLICK) 50 MG/ML SOAJ, Inject 50 mg into the skin once a week, Disp: 4 mL, Rfl: 6    Methotrexate, PF, (RASUVO) 25 MG/0.5ML chemo injection pen, INJECT 25MG UNDER THE SKIN EVERY WEEK, Disp: 2 mL, Rfl: 2    buPROPion (WELLBUTRIN XL) 300 MG extended release tablet, Take 1 tablet by

## 2024-04-30 NOTE — TELEPHONE ENCOUNTER
DOLV: 1/30/24  DONV: 4/30/24  LAST LAB DRAW: 4/23/24  LAST TB TEST: 11/15/23    Lab Results   Component Value Date     04/23/2024    K 4.1 04/23/2024     04/23/2024    CO2 21 (L) 04/23/2024    BUN 16 04/23/2024    CREATININE 0.8 04/23/2024    GLUCOSE 104 04/23/2024    CALCIUM 8.6 04/23/2024    PROT 7.5 04/23/2024    BILITOT 0.3 04/23/2024    ALKPHOS 98 04/23/2024    AST 15 04/23/2024    ALT 14 04/23/2024    LABGLOM >90 04/23/2024       Recent Labs     04/23/24  0952   WBC 7.2   HGB 13.7   HCT 42.1   MCV 89.6          Lab Results   Component Value Date    SEDRATE 14 04/23/2024       Lab Results   Component Value Date    CRP 0.40 04/23/2024

## 2024-05-24 ENCOUNTER — PATIENT MESSAGE (OUTPATIENT)
Dept: FAMILY MEDICINE CLINIC | Age: 24
End: 2024-05-24

## 2024-05-24 ENCOUNTER — OFFICE VISIT (OUTPATIENT)
Dept: FAMILY MEDICINE CLINIC | Age: 24
End: 2024-05-24
Payer: COMMERCIAL

## 2024-05-24 VITALS
WEIGHT: 215.6 LBS | DIASTOLIC BLOOD PRESSURE: 70 MMHG | BODY MASS INDEX: 38.2 KG/M2 | TEMPERATURE: 97.6 F | HEART RATE: 84 BPM | RESPIRATION RATE: 16 BRPM | SYSTOLIC BLOOD PRESSURE: 110 MMHG

## 2024-05-24 DIAGNOSIS — Z76.89 ENCOUNTER FOR WEIGHT MANAGEMENT: Primary | ICD-10-CM

## 2024-05-24 DIAGNOSIS — E66.09 CLASS 2 OBESITY DUE TO EXCESS CALORIES WITHOUT SERIOUS COMORBIDITY WITH BODY MASS INDEX (BMI) OF 38.0 TO 38.9 IN ADULT: ICD-10-CM

## 2024-05-24 PROCEDURE — 99213 OFFICE O/P EST LOW 20 MIN: CPT | Performed by: NURSE PRACTITIONER

## 2024-05-24 NOTE — PROGRESS NOTES
[facility]  [address]  [facility] [address]  Dept: [phone]  Dept Fax: [phone]  SUBJECTIVE     Chief Complaint   Patient presents with    Follow-up     Pt here for Adipex f/u, no concerns at this time       Gloria Bro is a23 y.o.female    Pt presents for supervised weight management visit- Finished month #2 adipex. Denies side effects.     Diet  - Watching calorie intake and trying to eat healthier.     Exercise - works a lot. Started walking more.       Starting weight- 218 lbs  Previous weight- 207 lbs  Current - 215 lbs  Weight Loss from last visit: + 8 lbs      Review of Systems   Constitutional:  Negative for chills, diaphoresis and fever.   Respiratory:  Negative for shortness of breath.    Cardiovascular:  Negative for chest pain, palpitations and leg swelling.   Gastrointestinal:  Negative for blood in stool, constipation, diarrhea, nausea and vomiting.   Genitourinary:  Negative for dysuria and hematuria.   Musculoskeletal:  Negative for myalgias.   Neurological:  Negative for dizziness and headaches.   All other systems reviewed and are negative.      OBJECTIVE     /70 (Site: Right Upper Arm, Position: Sitting)   Pulse 84   Temp 97.6 °F (36.4 °C)   Resp 16   Wt 97.8 kg (215 lb 9.6 oz)   BMI 38.20 kg/m²     Wt Readings from Last 3 Encounters:   05/24/24 97.8 kg (215 lb 9.6 oz)   04/30/24 94.1 kg (207 lb 7.3 oz)   04/19/24 94.1 kg (207 lb 6.4 oz)     Body mass index is 38.2 kg/m².      Physical Exam  Vitals and nursing note reviewed.   Constitutional:       Appearance: She is well-developed. She is obese.   HENT:      Head: Normocephalic and atraumatic.      Right Ear: External ear normal.      Left Ear: External ear normal.      Nose: Nose normal.   Eyes:      Conjunctiva/sclera: Conjunctivae normal.      Pupils: Pupils are equal, round, and reactive to light.   Cardiovascular:      Rate and Rhythm: Normal rate and regular rhythm.      Heart sounds: Normal heart

## 2024-05-28 NOTE — TELEPHONE ENCOUNTER
From: Gloria Bro  To: Rupali Solomon  Sent: 5/24/2024 8:11 PM EDT  Subject: Weight loss injection     After thinking about it I’d like to try an injectable medication. Which one do you think would be best for me?

## 2024-05-30 NOTE — PROGRESS NOTES
Specialty Medication Service    Patient's Name: Gloria Bro YOB: 2000            Reason for visit: Gloria Bro is a 23 y.o. female presenting today for Specialty Medication Service visit follow up. Patient last seen by [facility] 12/7/2023. Patient continues on [facility] formulary medication, Enbrel. Pharmacy completed Specialty Medication Service visit for medication monitoring and counseling. Medication list updated.    Specialty Medication: Enbrel Sureclick 50mg/ml SOAJ   Frequency: Every 7 days  Indication: Rheumatoid Arthritis  Initially Diagnosed: ~6/2023  Additional Therapy:   Acetaminophen  Methotrexate  Folic acid  Turmeric  Previous Therapy:   Hydroxychloroquine     Specialist:   Brie Hermosillo  [address] [place] [address] [address] [place] [address]  [phone]  Specialist Progress Note Available: Yes, EPIC  Last Specialist Visit: 4/30/2024  Reports improvement in her joint pain with the combination of methotrexate and Enbrel. Has episodes of pain in her hands, shoulders and right knee. Tolerable to the most part. No significant joint swelling. Morning stiffness lasts for 20 minutes.    Allergies   Allergen Reactions    Cefdinir Nausea Only       Past Medical History:   Diagnosis Date    Allergic     Anxiety     Depression     Headache     IBS (irritable bowel syndrome)     Inflammatory bowel disease     Insomnia     Rheumatoid arthritis (HCC)     Scoliosis     Torticollis     Vision abnormalities       Social History     Tobacco Use    Smoking status: Never    Smokeless tobacco: Never   Substance Use Topics    Alcohol use: No     Family History   Problem Relation Age of Onset    Arthritis Mother     High Blood Pressure Mother     Anxiety Disorder Father     Asthma Sister     Thyroid Disease Maternal Grandmother     Cancer Paternal Grandfather         Thyroid       INTERM HISTORY  Have you been diagnosed with any additional conditions since we last talked? no  Have you developed any new allergies

## 2024-06-06 ENCOUNTER — PHARMACY VISIT (OUTPATIENT)
Dept: INTERNAL MEDICINE | Age: 24
End: 2024-06-06

## 2024-06-06 DIAGNOSIS — M06.00 SERONEGATIVE RHEUMATOID ARTHRITIS (HCC): ICD-10-CM

## 2024-06-06 RX ORDER — MEDROXYPROGESTERONE ACETATE 150 MG/ML
50 INJECTION, SUSPENSION INTRAMUSCULAR WEEKLY
Qty: 4 ML | Refills: 6 | Status: SHIPPED | OUTPATIENT
Start: 2024-06-06

## 2024-06-06 ASSESSMENT — ROUTINE ASSESSMENT OF PATIENT INDEX DATA (RAPID3)
ON A SCALE OF ONE TO TEN, HOW DIFFICULT WAS IT FOR YOU TO COMPLETE THE LISTED DAILY PHYSICAL TASKS OVER THE LAST WEEK: 2.67
TOTAL RAPID3 SCORE: 8
ON A SCALE OF ONE TO TEN, HOW MUCH PAIN HAVE YOU HAD BECAUSE OF YOUR CONDITION OVER THE PAST WEEK?: 3
ON A SCALE OF ONE TO TEN, CONSIDERING ALL THE WAYS IN WHICH ILLNESS AND HEALTH CONDITIONS MAY AFFECT YOU AT THIS TIME, PLEASE INDICATE BELOW HOW YOU ARE DOING:: 5
TOTAL RAPID3 SCORE: 8

## 2024-06-13 ENCOUNTER — TELEPHONE (OUTPATIENT)
Dept: FAMILY MEDICINE CLINIC | Age: 24
End: 2024-06-13

## 2024-06-13 NOTE — TELEPHONE ENCOUNTER
PA denied: request was denied because the medication and/or diagnosis are not a covered benefit and are excluded from coverage in accordance with the terms and conditions of the benefit plan.

## 2024-06-13 NOTE — TELEPHONE ENCOUNTER
PA for Wegovy 0.25 mg/0.5 ml was started today on CoverMyMeds using pts primary insurance [facility] and the immediate response was the following: This drug/product is not covered under the pharmacy benefit. Prior Authorization is not available.    PA for Wegovy 0.25 mg/0.5 ml was also started on CoverMyMeds today using pts secondary insurance [facility]. Will await response.

## 2024-06-19 ENCOUNTER — TELEPHONE (OUTPATIENT)
Dept: FAMILY MEDICINE CLINIC | Age: 24
End: 2024-06-19

## 2024-06-19 NOTE — TELEPHONE ENCOUNTER
Patient calling in. She would like to start Contrave or the components of Contrave. Please send Rx to [facility].

## 2024-06-20 DIAGNOSIS — M19.90 INFLAMMATORY ARTHRITIS: ICD-10-CM

## 2024-06-20 DIAGNOSIS — F41.9 ANXIETY: ICD-10-CM

## 2024-06-20 DIAGNOSIS — F32.A DEPRESSION, UNSPECIFIED DEPRESSION TYPE: ICD-10-CM

## 2024-06-20 DIAGNOSIS — G47.00 INSOMNIA, UNSPECIFIED TYPE: ICD-10-CM

## 2024-06-20 DIAGNOSIS — Z79.899 ENCOUNTER FOR LONG-TERM (CURRENT) USE OF HIGH-RISK MEDICATION: ICD-10-CM

## 2024-06-20 RX ORDER — HYDROXYZINE HYDROCHLORIDE 25 MG/1
TABLET, FILM COATED ORAL
Qty: 90 TABLET | Refills: 0 | Status: SHIPPED | OUTPATIENT
Start: 2024-06-20

## 2024-06-20 RX ORDER — BUPROPION HYDROCHLORIDE 300 MG/1
300 TABLET ORAL EVERY MORNING
Qty: 90 TABLET | Refills: 0 | Status: SHIPPED | OUTPATIENT
Start: 2024-06-20

## 2024-06-20 RX ORDER — FLUOXETINE HYDROCHLORIDE 40 MG/1
CAPSULE ORAL
Qty: 90 CAPSULE | Refills: 0 | Status: SHIPPED | OUTPATIENT
Start: 2024-06-20

## 2024-06-20 RX ORDER — HYDROXYCHLOROQUINE SULFATE 200 MG/1
200 TABLET, FILM COATED ORAL DAILY
Qty: 90 TABLET | Refills: 1 | OUTPATIENT
Start: 2024-06-20

## 2024-06-20 RX ORDER — FLUOXETINE HYDROCHLORIDE 20 MG/1
CAPSULE ORAL
Qty: 90 CAPSULE | Refills: 0 | Status: SHIPPED | OUTPATIENT
Start: 2024-06-20

## 2024-06-20 NOTE — TELEPHONE ENCOUNTER
This medication refill is regarding a electronic request. Refill requested by  [facility] .    Requested Prescriptions     Pending Prescriptions Disp Refills    hydrOXYzine HCl (ATARAX) 25 MG tablet [Pharmacy Med Name: HYDROXYZINE HCL 25MG TABS] 90 tablet 0     Sig: TAKE ONE TABLET BY MOUTH EVERY NIGHT AS NEEDED FOR ANXIETY     Date of last visit: 5/24/2024   Date of next visit: None  Date of last refill: 3/26/24 #90/0    Rx verified, ordered and set to EP.

## 2024-06-20 NOTE — TELEPHONE ENCOUNTER
This medication refill is regarding a electronic request. Refill requested by  [facility] .    Requested Prescriptions     Pending Prescriptions Disp Refills    buPROPion (WELLBUTRIN XL) 300 MG extended release tablet [Pharmacy Med Name: BUPROPION HCL ER (XL) 300MG TB24] 90 tablet 3     Sig: TAKE ONE TABLET BY MOUTH EVERY MORNING    FLUoxetine (PROZAC) 20 MG capsule [Pharmacy Med Name: FLUOXETINE HCL 20MG CAPS] 90 capsule 3     Sig: TAKE ONE CAPSULE BY MOUTH ONCE A DAY WITH 40MG CAPSULE FOR A TOTAL OF 60MG DAILY    FLUoxetine (PROZAC) 40 MG capsule [Pharmacy Med Name: FLUOXETINE HCL 40MG CAPS] 90 capsule 3     Sig: TAKE ONE CAPSULE BY MOUTH ONCE A DAY WITH 20MG CAPSULE FOR A TOTAL OF 60MG DAILY     Date of last visit: 5/24/2024   Date of next visit: None  Date of last refill: 7/27/23 #90/3 for all 3 of them    Rx verified, ordered and set to EP.

## 2024-07-08 ENCOUNTER — PATIENT MESSAGE (OUTPATIENT)
Dept: FAMILY MEDICINE CLINIC | Age: 24
End: 2024-07-08

## 2024-07-09 NOTE — TELEPHONE ENCOUNTER
From: Gloria Bro  To: Rupali Solomon  Sent: 7/8/2024 7:32 PM EDT  Subject: jatin Bejarano with a copay card is turning out to be way too expensive is it possible to send in the generic versions?

## 2024-07-15 DIAGNOSIS — Z79.899 ENCOUNTER FOR LONG-TERM (CURRENT) USE OF HIGH-RISK MEDICATION: ICD-10-CM

## 2024-07-15 DIAGNOSIS — M19.90 INFLAMMATORY ARTHRITIS: ICD-10-CM

## 2024-07-15 RX ORDER — FOLIC ACID 1 MG/1
1000 TABLET ORAL DAILY
Qty: 90 TABLET | Refills: 1 | Status: SHIPPED | OUTPATIENT
Start: 2024-07-15

## 2024-07-15 NOTE — TELEPHONE ENCOUNTER
DOLV: 4/30/24  DONV: n/a    Left voice message and sent mychart message to reschedule appointment.

## 2024-07-17 ENCOUNTER — PATIENT MESSAGE (OUTPATIENT)
Dept: FAMILY MEDICINE CLINIC | Age: 24
End: 2024-07-17

## 2024-07-18 ENCOUNTER — PATIENT MESSAGE (OUTPATIENT)
Dept: RHEUMATOLOGY | Age: 24
End: 2024-07-18

## 2024-07-18 NOTE — TELEPHONE ENCOUNTER
From: Gloria Bro  To: Rupali Solomon  Sent: 7/17/2024 8:07 PM EDT  Subject: Ozempic    After looking into Ozempic/Mounjaro through my mother’s insurance there is a possibility that they may cover both if sent to [facility]. Can you please send a prescription? Either medication is fine and I am fine with whatever you decide. Thank you!!

## 2024-07-18 NOTE — TELEPHONE ENCOUNTER
From: Gloria Bro  To: Dr. Brie Hermosillo  Sent: 7/17/2024 7:49 PM EDT  Subject: Appointment Request    Appointment Request From: Gloria Bro    With Provider: Brie Hermosillo MD [[facility]]    Preferred Date Range: 8/19/2024 – 8/19/2024    Preferred Times: Monday Afternoon, Tuesday Afternoon, Wednesday Afternoon, Thursday Afternoon, Friday Afternoon    Reason for visit: Request an Appointment    Comments:  Follow up

## 2024-07-24 DIAGNOSIS — Z79.899 ENCOUNTER FOR LONG-TERM (CURRENT) USE OF HIGH-RISK MEDICATION: ICD-10-CM

## 2024-07-24 DIAGNOSIS — M06.00 SERONEGATIVE RHEUMATOID ARTHRITIS (HCC): ICD-10-CM

## 2024-07-24 RX ORDER — METHOTREXATE 25 MG/.5ML
INJECTION, SOLUTION SUBCUTANEOUS
Qty: 2 ML | Refills: 2 | OUTPATIENT
Start: 2024-07-24

## 2024-07-25 ENCOUNTER — LAB (OUTPATIENT)
Dept: LAB | Age: 24
End: 2024-07-25

## 2024-07-25 DIAGNOSIS — Z79.899 ENCOUNTER FOR LONG-TERM (CURRENT) USE OF HIGH-RISK MEDICATION: ICD-10-CM

## 2024-07-25 LAB
ALBUMIN SERPL BCG-MCNC: 4.4 G/DL (ref 3.5–5.1)
ALP SERPL-CCNC: 97 U/L (ref 38–126)
ALT SERPL W/O P-5'-P-CCNC: 11 U/L (ref 11–66)
ANION GAP SERPL CALC-SCNC: 11 MEQ/L (ref 8–16)
AST SERPL-CCNC: 18 U/L (ref 5–40)
BASOPHILS ABSOLUTE: 0.1 THOU/MM3 (ref 0–0.1)
BASOPHILS NFR BLD AUTO: 1 %
BILIRUB SERPL-MCNC: 0.4 MG/DL (ref 0.3–1.2)
BUN SERPL-MCNC: 17 MG/DL (ref 7–22)
CALCIUM SERPL-MCNC: 9 MG/DL (ref 8.5–10.5)
CHLORIDE SERPL-SCNC: 105 MEQ/L (ref 98–111)
CO2 SERPL-SCNC: 23 MEQ/L (ref 23–33)
CREAT SERPL-MCNC: 0.8 MG/DL (ref 0.4–1.2)
CRP SERPL-MCNC: < 0.3 MG/DL (ref 0–1)
DEPRECATED RDW RBC AUTO: 43.7 FL (ref 35–45)
EOSINOPHIL NFR BLD AUTO: 1.9 %
EOSINOPHILS ABSOLUTE: 0.1 THOU/MM3 (ref 0–0.4)
ERYTHROCYTE [DISTWIDTH] IN BLOOD BY AUTOMATED COUNT: 13.3 % (ref 11.5–14.5)
ERYTHROCYTE [SEDIMENTATION RATE] IN BLOOD BY WESTERGREN METHOD: 27 MM/HR (ref 0–20)
GFR SERPL CREATININE-BSD FRML MDRD: > 90 ML/MIN/1.73M2
GLUCOSE SERPL-MCNC: 80 MG/DL (ref 70–108)
HCT VFR BLD AUTO: 43.3 % (ref 37–47)
HGB BLD-MCNC: 13.8 GM/DL (ref 12–16)
IMM GRANULOCYTES # BLD AUTO: 0.02 THOU/MM3 (ref 0–0.07)
IMM GRANULOCYTES NFR BLD AUTO: 0.3 %
LYMPHOCYTES ABSOLUTE: 2.2 THOU/MM3 (ref 1–4.8)
LYMPHOCYTES NFR BLD AUTO: 31 %
MCH RBC QN AUTO: 28.8 PG (ref 26–33)
MCHC RBC AUTO-ENTMCNC: 31.9 GM/DL (ref 32.2–35.5)
MCV RBC AUTO: 90.2 FL (ref 81–99)
MONOCYTES ABSOLUTE: 0.5 THOU/MM3 (ref 0.4–1.3)
MONOCYTES NFR BLD AUTO: 7.6 %
NEUTROPHILS ABSOLUTE: 4.1 THOU/MM3 (ref 1.8–7.7)
NEUTROPHILS NFR BLD AUTO: 58.2 %
NRBC BLD AUTO-RTO: 0 /100 WBC
PLATELET # BLD AUTO: 287 THOU/MM3 (ref 130–400)
PMV BLD AUTO: 11.4 FL (ref 9.4–12.4)
POTASSIUM SERPL-SCNC: 4 MEQ/L (ref 3.5–5.2)
PROT SERPL-MCNC: 7.3 G/DL (ref 6.1–8)
RBC # BLD AUTO: 4.8 MILL/MM3 (ref 4.2–5.4)
SODIUM SERPL-SCNC: 139 MEQ/L (ref 135–145)
WBC # BLD AUTO: 7 THOU/MM3 (ref 4.8–10.8)

## 2024-07-29 ENCOUNTER — TELEPHONE (OUTPATIENT)
Dept: RHEUMATOLOGY | Age: 24
End: 2024-07-29

## 2024-07-29 NOTE — TELEPHONE ENCOUNTER
We received your lab results. Your blood counts, kidney function, and liver function all were normal. Your inflammatory markers were unremarkable. Please call with any questions.

## 2024-08-05 ENCOUNTER — OFFICE VISIT (OUTPATIENT)
Dept: FAMILY MEDICINE CLINIC | Age: 24
End: 2024-08-05
Payer: COMMERCIAL

## 2024-08-05 VITALS
SYSTOLIC BLOOD PRESSURE: 122 MMHG | BODY MASS INDEX: 38.63 KG/M2 | HEART RATE: 84 BPM | TEMPERATURE: 98.5 F | WEIGHT: 218 LBS | DIASTOLIC BLOOD PRESSURE: 80 MMHG

## 2024-08-05 DIAGNOSIS — M05.9 RHEUMATOID ARTHRITIS WITH RHEUMATOID FACTOR, UNSPECIFIED (HCC): ICD-10-CM

## 2024-08-05 DIAGNOSIS — M06.9 RHEUMATOID ARTHRITIS, INVOLVING UNSPECIFIED SITE, UNSPECIFIED WHETHER RHEUMATOID FACTOR PRESENT (HCC): ICD-10-CM

## 2024-08-05 DIAGNOSIS — Z76.89 ENCOUNTER FOR WEIGHT MANAGEMENT: ICD-10-CM

## 2024-08-05 PROCEDURE — 99213 OFFICE O/P EST LOW 20 MIN: CPT | Performed by: NURSE PRACTITIONER

## 2024-08-05 RX ORDER — PHENTERMINE HYDROCHLORIDE 37.5 MG/1
37.5 CAPSULE ORAL EVERY MORNING
Qty: 30 CAPSULE | Refills: 0 | Status: SHIPPED | OUTPATIENT
Start: 2024-08-05 | End: 2024-09-04

## 2024-08-05 NOTE — PROGRESS NOTES
[facility]  [address] [facility] [address]  Dept: [phone]  Dept Fax: [phone]  SUBJECTIVE     Chief Complaint   Patient presents with    Other     Pt presents to discuss adipex       Gloria ROSARIO Bro is a23 y.o.female    Pt presents for supervised weight management visit- Would like to restart Adipex     Diet - \"could be better\"    Exercise - active at work    Starting weight- 218 lbs    Pt has been diagnosed with rheumatoid arthritis. Following with rheumatology.    Review of Systems   Constitutional:  Negative for chills, diaphoresis and fever.   Respiratory:  Negative for shortness of breath.    Cardiovascular:  Negative for chest pain, palpitations and leg swelling.   Gastrointestinal:  Negative for blood in stool, constipation, diarrhea, nausea and vomiting.   Genitourinary:  Negative for dysuria and hematuria.   Musculoskeletal:  Negative for myalgias.   Neurological:  Negative for dizziness and headaches.   All other systems reviewed and are negative.      OBJECTIVE     /80 (Site: Left Upper Arm, Position: Sitting)   Pulse 84   Temp 98.5 °F (36.9 °C)   Wt 98.9 kg (218 lb)   BMI 38.63 kg/m²     Wt Readings from Last 3 Encounters:   08/05/24 98.9 kg (218 lb)   05/24/24 97.8 kg (215 lb 9.6 oz)   04/30/24 94.1 kg (207 lb 7.3 oz)     Body mass index is 38.63 kg/m².      Physical Exam  Vitals and nursing note reviewed.   Constitutional:       Appearance: She is well-developed. She is obese.   HENT:      Head: Normocephalic and atraumatic.   Cardiovascular:      Rate and Rhythm: Normal rate and regular rhythm.      Heart sounds: Normal heart sounds.   Pulmonary:      Effort: Pulmonary effort is normal.      Breath sounds: Normal breath sounds.   Abdominal:      General: Bowel sounds are normal.      Palpations: Abdomen is soft.   Musculoskeletal:         General: Normal range of motion.      Cervical back: Normal range of motion and neck supple.   Skin:     General: Skin is warm

## 2024-08-06 ASSESSMENT — ENCOUNTER SYMPTOMS
NAUSEA: 0
DIARRHEA: 0
SHORTNESS OF BREATH: 0
VOMITING: 0
CONSTIPATION: 0
BLOOD IN STOOL: 0

## 2024-09-05 ENCOUNTER — OFFICE VISIT (OUTPATIENT)
Dept: FAMILY MEDICINE CLINIC | Age: 24
End: 2024-09-05

## 2024-09-05 VITALS
HEIGHT: 63 IN | RESPIRATION RATE: 16 BRPM | HEART RATE: 84 BPM | SYSTOLIC BLOOD PRESSURE: 118 MMHG | BODY MASS INDEX: 36.82 KG/M2 | WEIGHT: 207.8 LBS | TEMPERATURE: 97.9 F | DIASTOLIC BLOOD PRESSURE: 74 MMHG

## 2024-09-05 DIAGNOSIS — Z00.00 LABORATORY EXAM ORDERED AS PART OF ROUTINE GENERAL MEDICAL EXAMINATION: Primary | ICD-10-CM

## 2024-09-05 DIAGNOSIS — Z76.89 ENCOUNTER FOR WEIGHT MANAGEMENT: ICD-10-CM

## 2024-09-05 RX ORDER — PHENTERMINE HYDROCHLORIDE 37.5 MG/1
37.5 TABLET ORAL
Qty: 30 TABLET | Refills: 0 | Status: SHIPPED | OUTPATIENT
Start: 2024-09-05 | End: 2024-10-05

## 2024-09-05 ASSESSMENT — ENCOUNTER SYMPTOMS
NAUSEA: 0
SHORTNESS OF BREATH: 0
BLOOD IN STOOL: 0
CONSTIPATION: 0
VOMITING: 0
DIARRHEA: 0

## 2024-09-05 NOTE — PROGRESS NOTES
[facility]  [address]  [facility] [address]  Dept: [phone]  Dept Fax: [phone]  SUBJECTIVE     Chief Complaint   Patient presents with    Follow-up     Pt here for weight loss f/u       Gloria Bro is a23 y.o.female    Pt presents for supervised weight management visit- Finished month #1 adipex    Diet - eating less, portion control      Exercise - walking more      Starting weight- 218 lbs  Current weight - 207 lbs  Weight Loss from last visit: 11 lbs  Total weight loss since start of treatment: 11 lbs  % lost from start of treatment:     Review of Systems   Constitutional:  Negative for chills, diaphoresis and fever.   Respiratory:  Negative for shortness of breath.    Cardiovascular:  Negative for chest pain, palpitations and leg swelling.   Gastrointestinal:  Negative for blood in stool, constipation, diarrhea, nausea and vomiting.   Genitourinary:  Negative for dysuria and hematuria.   Musculoskeletal:  Negative for myalgias.   Neurological:  Negative for dizziness and headaches.   All other systems reviewed and are negative.          OBJECTIVE     /74 (Site: Right Upper Arm, Position: Sitting)   Pulse 84   Temp 97.9 °F (36.6 °C)   Resp 16   Ht 1.6 m (5' 3\")   Wt 94.3 kg (207 lb 12.8 oz)   BMI 36.81 kg/m²     Wt Readings from Last 3 Encounters:   09/05/24 94.3 kg (207 lb 12.8 oz)   08/05/24 98.9 kg (218 lb)   05/24/24 97.8 kg (215 lb 9.6 oz)     Body mass index is 36.81 kg/m².      Physical Exam  Vitals and nursing note reviewed.   Constitutional:       Appearance: She is well-developed.   HENT:      Head: Normocephalic and atraumatic.   Cardiovascular:      Rate and Rhythm: Normal rate and regular rhythm.      Heart sounds: Normal heart sounds.   Pulmonary:      Effort: Pulmonary effort is normal.      Breath sounds: Normal breath sounds.   Musculoskeletal:         General: Normal range of motion.      Cervical back: Normal range of motion and neck supple.   Skin:

## 2024-09-07 ENCOUNTER — LAB (OUTPATIENT)
Dept: LAB | Age: 24
End: 2024-09-07

## 2024-09-07 DIAGNOSIS — Z00.00 LABORATORY EXAM ORDERED AS PART OF ROUTINE GENERAL MEDICAL EXAMINATION: ICD-10-CM

## 2024-09-07 LAB
CHOLEST SERPL-MCNC: 144 MG/DL (ref 100–199)
FASTING: YES
GLUCOSE SERPL-MCNC: 96 MG/DL (ref 70–108)
HDLC SERPL-MCNC: 50 MG/DL
LDLC SERPL CALC-MCNC: 77 MG/DL
TRIGL SERPL-MCNC: 84 MG/DL (ref 0–199)

## 2024-09-17 DIAGNOSIS — F41.9 ANXIETY: ICD-10-CM

## 2024-09-17 DIAGNOSIS — F32.A DEPRESSION, UNSPECIFIED DEPRESSION TYPE: ICD-10-CM

## 2024-09-17 DIAGNOSIS — G47.00 INSOMNIA, UNSPECIFIED TYPE: ICD-10-CM

## 2024-09-17 RX ORDER — BUPROPION HYDROCHLORIDE 300 MG/1
300 TABLET ORAL EVERY MORNING
Qty: 90 TABLET | Refills: 0 | Status: SHIPPED | OUTPATIENT
Start: 2024-09-17

## 2024-09-17 RX ORDER — HYDROXYZINE HYDROCHLORIDE 25 MG/1
TABLET, FILM COATED ORAL
Qty: 90 TABLET | Refills: 0 | Status: SHIPPED | OUTPATIENT
Start: 2024-09-17

## 2024-09-17 RX ORDER — FLUOXETINE 40 MG/1
CAPSULE ORAL
Qty: 90 CAPSULE | Refills: 0 | Status: SHIPPED | OUTPATIENT
Start: 2024-09-17

## 2024-10-04 ENCOUNTER — OFFICE VISIT (OUTPATIENT)
Dept: FAMILY MEDICINE CLINIC | Age: 24
End: 2024-10-04

## 2024-10-04 VITALS
BODY MASS INDEX: 35.04 KG/M2 | RESPIRATION RATE: 16 BRPM | WEIGHT: 197.8 LBS | DIASTOLIC BLOOD PRESSURE: 76 MMHG | TEMPERATURE: 97.6 F | SYSTOLIC BLOOD PRESSURE: 118 MMHG | HEART RATE: 72 BPM

## 2024-10-04 DIAGNOSIS — Z76.89 ENCOUNTER FOR WEIGHT MANAGEMENT: Primary | ICD-10-CM

## 2024-10-04 RX ORDER — PHENTERMINE HYDROCHLORIDE 37.5 MG/1
37.5 TABLET ORAL
Qty: 30 TABLET | Refills: 0 | Status: SHIPPED | OUTPATIENT
Start: 2024-10-04 | End: 2024-11-03

## 2024-10-04 ASSESSMENT — ENCOUNTER SYMPTOMS
BLOOD IN STOOL: 0
VOMITING: 0
CONSTIPATION: 0
SHORTNESS OF BREATH: 0
DIARRHEA: 0
NAUSEA: 0

## 2024-10-04 NOTE — PROGRESS NOTES
[facility]  [address] [facility] [address]  Dept: [phone]  Dept Fax: [phone]  SUBJECTIVE     Chief Complaint   Patient presents with    1 Month Follow-Up     Isabel Bro is a23 y.o.female    Pt presents for supervised weight management visit- Finished month #2 adipex    Diet - eating less, portion control    Exercise - walking more. Yoga.      Starting weight- 218 lbs  Previous weight - 207 lbs  Current weight - 197 lbs  Weight Loss from last visit: 10 lbs  Total weight loss since start of treatment: 21 lbs  % lost from start of treatment:     Review of Systems   Constitutional:  Negative for chills, diaphoresis and fever.   Respiratory:  Negative for shortness of breath.    Cardiovascular:  Negative for chest pain, palpitations and leg swelling.   Gastrointestinal:  Negative for blood in stool, constipation, diarrhea, nausea and vomiting.   Genitourinary:  Negative for dysuria and hematuria.   Musculoskeletal:  Negative for myalgias.   Neurological:  Negative for dizziness and headaches.   All other systems reviewed and are negative.        OBJECTIVE     /76   Pulse 72   Temp 97.6 °F (36.4 °C) (Oral)   Resp 16   Wt 89.7 kg (197 lb 12.8 oz)   BMI 35.04 kg/m²     Wt Readings from Last 3 Encounters:   10/04/24 89.7 kg (197 lb 12.8 oz)   09/05/24 94.3 kg (207 lb 12.8 oz)   08/05/24 98.9 kg (218 lb)     Body mass index is 35.04 kg/m².      Physical Exam  Vitals and nursing note reviewed.   Constitutional:       Appearance: She is well-developed.   HENT:      Head: Normocephalic and atraumatic.   Cardiovascular:      Rate and Rhythm: Normal rate and regular rhythm.      Heart sounds: Normal heart sounds.   Pulmonary:      Effort: Pulmonary effort is normal.      Breath sounds: Normal breath sounds.   Musculoskeletal:         General: Normal range of motion.      Cervical back: Normal range of motion and neck supple.   Skin:     General: Skin is warm and dry.

## 2024-11-11 ENCOUNTER — OFFICE VISIT (OUTPATIENT)
Dept: FAMILY MEDICINE CLINIC | Age: 24
End: 2024-11-11

## 2024-11-11 VITALS
DIASTOLIC BLOOD PRESSURE: 72 MMHG | RESPIRATION RATE: 16 BRPM | HEIGHT: 63 IN | BODY MASS INDEX: 32.6 KG/M2 | SYSTOLIC BLOOD PRESSURE: 116 MMHG | WEIGHT: 184 LBS | HEART RATE: 80 BPM | TEMPERATURE: 98 F

## 2024-11-11 DIAGNOSIS — Z76.89 ENCOUNTER FOR WEIGHT MANAGEMENT: ICD-10-CM

## 2024-11-11 RX ORDER — PHENTERMINE HYDROCHLORIDE 37.5 MG/1
37.5 TABLET ORAL
Qty: 30 TABLET | Refills: 0 | Status: SHIPPED | OUTPATIENT
Start: 2024-11-11 | End: 2024-12-11

## 2024-11-11 ASSESSMENT — ENCOUNTER SYMPTOMS
NAUSEA: 0
VOMITING: 0
CONSTIPATION: 0
DIARRHEA: 0
SHORTNESS OF BREATH: 0
BLOOD IN STOOL: 0

## 2024-11-11 NOTE — PROGRESS NOTES
[facility]  [address]  [facility] [address]  Dept: [phone]  Dept Fax: [phone]  SUBJECTIVE     Chief Complaint   Patient presents with    1 Month Follow-Up     Delisageraldo Bro is a23 y.o.female    Pt presents for supervised weight management visit- Finished month #3 adipex    Diet - snacking less, portion control. Not eating at night.     Exercise - about 10,000 steps daily. Occasional Yoga.      Starting weight- 218 lbs  Previous weight - 197 lbs  Current weight - 184 lbs  Weight Loss from last visit: 13 lbs  Total weight loss since start of treatment: 34 lbs  % lost from start of treatment: >15%    Review of Systems   Constitutional:  Negative for chills, diaphoresis and fever.   Respiratory:  Negative for shortness of breath.    Cardiovascular:  Negative for chest pain, palpitations and leg swelling.   Gastrointestinal:  Negative for blood in stool, constipation, diarrhea, nausea and vomiting.   Genitourinary:  Negative for dysuria and hematuria.   Musculoskeletal:  Negative for myalgias.   Neurological:  Negative for dizziness and headaches.   All other systems reviewed and are negative.      OBJECTIVE     /72   Pulse 80   Temp 98 °F (36.7 °C) (Oral)   Resp 16   Ht 1.6 m (5' 3\")   Wt 83.5 kg (184 lb)   BMI 32.59 kg/m²     Wt Readings from Last 3 Encounters:   11/11/24 83.5 kg (184 lb)   10/04/24 89.7 kg (197 lb 12.8 oz)   09/05/24 94.3 kg (207 lb 12.8 oz)     Body mass index is 32.59 kg/m².      Physical Exam  Vitals and nursing note reviewed.   Constitutional:       Appearance: She is well-developed. She is obese.   HENT:      Head: Normocephalic and atraumatic.   Cardiovascular:      Rate and Rhythm: Normal rate and regular rhythm.      Heart sounds: Normal heart sounds.   Pulmonary:      Effort: Pulmonary effort is normal.      Breath sounds: Normal breath sounds.   Musculoskeletal:         General: Normal range of motion.      Cervical back: Normal range of

## 2024-11-13 ENCOUNTER — LAB (OUTPATIENT)
Dept: LAB | Age: 24
End: 2024-11-13

## 2024-11-13 ENCOUNTER — OFFICE VISIT (OUTPATIENT)
Dept: RHEUMATOLOGY | Age: 24
End: 2024-11-13
Payer: COMMERCIAL

## 2024-11-13 VITALS
HEIGHT: 63 IN | HEART RATE: 103 BPM | BODY MASS INDEX: 32.62 KG/M2 | SYSTOLIC BLOOD PRESSURE: 118 MMHG | WEIGHT: 184.08 LBS | OXYGEN SATURATION: 98 % | DIASTOLIC BLOOD PRESSURE: 68 MMHG

## 2024-11-13 DIAGNOSIS — M06.00 SERONEGATIVE RHEUMATOID ARTHRITIS (HCC): Primary | ICD-10-CM

## 2024-11-13 DIAGNOSIS — Z79.899 ENCOUNTER FOR LONG-TERM (CURRENT) USE OF HIGH-RISK MEDICATION: ICD-10-CM

## 2024-11-13 DIAGNOSIS — M06.00 SERONEGATIVE RHEUMATOID ARTHRITIS (HCC): ICD-10-CM

## 2024-11-13 LAB
ALBUMIN SERPL BCG-MCNC: 4.2 G/DL (ref 3.5–5.1)
ALP SERPL-CCNC: 104 U/L (ref 38–126)
ALT SERPL W/O P-5'-P-CCNC: 52 U/L (ref 11–66)
ANION GAP SERPL CALC-SCNC: 12 MEQ/L (ref 8–16)
AST SERPL-CCNC: 27 U/L (ref 5–40)
BASOPHILS ABSOLUTE: 0 THOU/MM3 (ref 0–0.1)
BASOPHILS NFR BLD AUTO: 0.6 %
BILIRUB SERPL-MCNC: 0.4 MG/DL (ref 0.3–1.2)
BUN SERPL-MCNC: 9 MG/DL (ref 7–22)
CALCIUM SERPL-MCNC: 9.8 MG/DL (ref 8.5–10.5)
CHLORIDE SERPL-SCNC: 104 MEQ/L (ref 98–111)
CO2 SERPL-SCNC: 23 MEQ/L (ref 23–33)
CREAT SERPL-MCNC: 0.9 MG/DL (ref 0.4–1.2)
CRP SERPL-MCNC: 0.64 MG/DL (ref 0–1)
DEPRECATED RDW RBC AUTO: 43.5 FL (ref 35–45)
EOSINOPHIL NFR BLD AUTO: 1.9 %
EOSINOPHILS ABSOLUTE: 0.1 THOU/MM3 (ref 0–0.4)
ERYTHROCYTE [DISTWIDTH] IN BLOOD BY AUTOMATED COUNT: 13.5 % (ref 11.5–14.5)
ERYTHROCYTE [SEDIMENTATION RATE] IN BLOOD BY WESTERGREN METHOD: 36 MM/HR (ref 0–20)
GFR SERPL CREATININE-BSD FRML MDRD: > 90 ML/MIN/1.73M2
GLUCOSE SERPL-MCNC: 94 MG/DL (ref 70–108)
HCT VFR BLD AUTO: 42.3 % (ref 37–47)
HGB BLD-MCNC: 14.1 GM/DL (ref 12–16)
IMM GRANULOCYTES # BLD AUTO: 0.01 THOU/MM3 (ref 0–0.07)
IMM GRANULOCYTES NFR BLD AUTO: 0.1 %
LYMPHOCYTES ABSOLUTE: 2 THOU/MM3 (ref 1–4.8)
LYMPHOCYTES NFR BLD AUTO: 25.6 %
MCH RBC QN AUTO: 29.4 PG (ref 26–33)
MCHC RBC AUTO-ENTMCNC: 33.3 GM/DL (ref 32.2–35.5)
MCV RBC AUTO: 88.1 FL (ref 81–99)
MONOCYTES ABSOLUTE: 0.6 THOU/MM3 (ref 0.4–1.3)
MONOCYTES NFR BLD AUTO: 7.7 %
NEUTROPHILS ABSOLUTE: 5 THOU/MM3 (ref 1.8–7.7)
NEUTROPHILS NFR BLD AUTO: 64.1 %
NRBC BLD AUTO-RTO: 0 /100 WBC
PLATELET # BLD AUTO: 301 THOU/MM3 (ref 130–400)
PMV BLD AUTO: 12.1 FL (ref 9.4–12.4)
POTASSIUM SERPL-SCNC: 4.4 MEQ/L (ref 3.5–5.2)
PROT SERPL-MCNC: 7.9 G/DL (ref 6.1–8)
RBC # BLD AUTO: 4.8 MILL/MM3 (ref 4.2–5.4)
SODIUM SERPL-SCNC: 139 MEQ/L (ref 135–145)
WBC # BLD AUTO: 7.8 THOU/MM3 (ref 4.8–10.8)

## 2024-11-13 PROCEDURE — 99214 OFFICE O/P EST MOD 30 MIN: CPT | Performed by: INTERNAL MEDICINE

## 2024-11-13 ASSESSMENT — ENCOUNTER SYMPTOMS
VOMITING: 0
SHORTNESS OF BREATH: 0
COUGH: 0
ABDOMINAL PAIN: 0
NAUSEA: 0

## 2024-11-13 NOTE — PROGRESS NOTES
[facility]   Rheumatology Clinic Note      11/13/2024         CHIEF COMPLAINT:    Chief Complaint   Patient presents with    6 Month Follow-Up     Inflammatory arthritis    Other     Pt  is tolerating well           HISTORY OF PRESENT ILLNESS:    Last seen in April 2024.    24 y.o. female with polyarticular seronegative inflammatory arthritis (+CHIKIS, -RF, -CCP) presents for follow up. Presently, she is on methotrexate 25 mg SQ weekly, folic acid 1 mg daily, and Enbrel 50 mg SQ once weekly (started early Dec 2023).  Past meds: hydroxychloroquine    Overall is doing well. No big RA flare ups since seen last.  No recent illness or infections.      Past Medical History:     has a past medical history of Allergic, Anxiety, Depression, Headache, IBS (irritable bowel syndrome), Inflammatory bowel disease, Insomnia, Rheumatoid arthritis (HCC), Scoliosis, Torticollis, and Vision abnormalities.    Past Surgical History:     has a past surgical history that includes Adenoidectomy; Tonsillectomy; and Knee arthroscopy (Left).    Current Medications:      Current Outpatient Medications:     Methotrexate, PF, (RASUVO) 20 MG/0.4ML chemo injection pen, Inject 20 mg into the skin once a week, Disp: 4 Adjustable Dose Pre-filled Pen Syringe, Rfl: 1    phentermine (ADIPEX-P) 37.5 MG tablet, Take 1 tablet by mouth every morning (before breakfast) for 30 days. Max Daily Amount: 37.5 mg, Disp: 30 tablet, Rfl: 0    FLUoxetine (PROZAC) 20 MG capsule, TAKE ONE CAPSULE BY MOUTH ONCE A DAY WITH 40MG CAPSULE FOR A TOTAL OF 60MG DAILY, Disp: 90 capsule, Rfl: 0    FLUoxetine (PROZAC) 40 MG capsule, TAKE ONE CAPSULE BY MOUTH ONCE A DAY WITH 20MG CAPSULE FOR A TOTAL OF 60MG DAILY, Disp: 90 capsule, Rfl: 0    hydrOXYzine HCl (ATARAX) 25 MG tablet, TAKE ONE TABLET BY MOUTH EVERY NIGHT AS NEEDED FOR ANXIETY, Disp: 90 tablet, Rfl: 0    buPROPion (WELLBUTRIN XL) 300 MG extended release tablet, TAKE ONE TABLET BY MOUTH EVERY MORNING, Disp: 90

## 2024-12-09 NOTE — PROGRESS NOTES
activities at home, at work and in your community, and responsibilities as a parent, child, spouse, employee, friend, etc.) 3 3   To what extent are you able to carry out your everyday physical activities such as walking, climbing stairs, carrying groceries, or moving a chair? 5 4   In the past 7 days how often have you been bothered by emotional problems such as feeling anxious, depressed or irritable? 4 4   In the past 7 days how would you rate your fatigue on average? 4 4   In the past 7 days how would you rate your pain on average? 5 5   PROMIS Physical Score 17 16   PROMIS Mental Score 13 13     Rheumatoid Arthritis  Gloria Bro is a 24 y.o. female being treated for Rheumatoid Arthritis  Medication Reconciliation completed (information obtained from patient), no drug-drug interactions identified. Allergy and diagnosis info reviewed and updated. Gloria Bro has a history remarkable for the following conditions: Rheumatoid arthritis, IBS, anxiety, depression, insomnia   Current therapy includes: Enbrel 50mg once weekly, acetaminophen prn, methotrexate 20mg once weekly, folic acid 1mg once daily, turmeric 500mg once daily   Medication Effectiveness: Patient disease is  well controlled on current therapy.   Patient had no questions regarding the medication's warnings, precautions, and contraindications. Confirmed appropriate storage and disposal.  Patient had no concerns with the administration process.  Current disease state symptoms include: Current symptoms include joint pain 4-5 /10 (scale 0-10, 10 is the worst pain imaginable) and morning stiffness lasting ~20 minutes. Patient states symptoms are most present in her hands and sometimes in her lower back. She denies any joint swelling. Patient denies any flares in the past 6 months.  No side effects/adverse events reported, and no adherence issues identified.  Functional and cognitive limitations include: Limitation on activities include none.  Patient

## 2024-12-12 ENCOUNTER — PHARMACY VISIT (OUTPATIENT)
Dept: INTERNAL MEDICINE | Age: 24
End: 2024-12-12

## 2024-12-12 ENCOUNTER — OFFICE VISIT (OUTPATIENT)
Dept: FAMILY MEDICINE CLINIC | Age: 24
End: 2024-12-12

## 2024-12-12 VITALS
HEART RATE: 92 BPM | OXYGEN SATURATION: 97 % | HEIGHT: 63 IN | SYSTOLIC BLOOD PRESSURE: 120 MMHG | BODY MASS INDEX: 31.59 KG/M2 | WEIGHT: 178.3 LBS | DIASTOLIC BLOOD PRESSURE: 72 MMHG | RESPIRATION RATE: 16 BRPM | TEMPERATURE: 98 F

## 2024-12-12 DIAGNOSIS — F32.A DEPRESSION, UNSPECIFIED DEPRESSION TYPE: ICD-10-CM

## 2024-12-12 DIAGNOSIS — F41.9 ANXIETY: ICD-10-CM

## 2024-12-12 DIAGNOSIS — G47.00 INSOMNIA, UNSPECIFIED TYPE: ICD-10-CM

## 2024-12-12 DIAGNOSIS — Z76.89 ENCOUNTER FOR WEIGHT MANAGEMENT: Primary | ICD-10-CM

## 2024-12-12 DIAGNOSIS — M06.00 SERONEGATIVE RHEUMATOID ARTHRITIS (HCC): ICD-10-CM

## 2024-12-12 RX ORDER — MEDROXYPROGESTERONE ACETATE 150 MG/ML
50 INJECTION, SUSPENSION INTRAMUSCULAR WEEKLY
Qty: 4 ML | Refills: 6 | Status: SHIPPED | OUTPATIENT
Start: 2024-12-12

## 2024-12-12 RX ORDER — HYDROXYZINE HYDROCHLORIDE 25 MG/1
TABLET, FILM COATED ORAL
Qty: 90 TABLET | Refills: 0 | OUTPATIENT
Start: 2024-12-12

## 2024-12-12 RX ORDER — PHENTERMINE HYDROCHLORIDE 37.5 MG/1
37.5 TABLET ORAL
Qty: 30 TABLET | Refills: 0 | Status: SHIPPED | OUTPATIENT
Start: 2024-12-12 | End: 2025-01-11

## 2024-12-12 RX ORDER — FLUOXETINE 40 MG/1
CAPSULE ORAL
Qty: 90 CAPSULE | Refills: 0 | OUTPATIENT
Start: 2024-12-12

## 2024-12-12 RX ORDER — BUPROPION HYDROCHLORIDE 300 MG/1
300 TABLET ORAL EVERY MORNING
Qty: 90 TABLET | Refills: 0 | OUTPATIENT
Start: 2024-12-12

## 2024-12-12 ASSESSMENT — ENCOUNTER SYMPTOMS
VOMITING: 0
CONSTIPATION: 0
SHORTNESS OF BREATH: 0
BLOOD IN STOOL: 0
NAUSEA: 0
DIARRHEA: 0

## 2024-12-12 ASSESSMENT — PROMIS GLOBAL HEALTH SCALE
SUM OF RESPONSES TO QUESTIONS 3, 6, 7, & 8: 17
TO WHAT EXTENT ARE YOU ABLE TO CARRY OUT YOUR EVERYDAY PHYSICAL ACTIVITIES SUCH AS WALKING, CLIMBING STAIRS, CARRYING GROCERIES, OR MOVING A CHAIR [ON A SCALE OF 1 (NOT AT ALL) TO 5 (COMPLETELY)]?: COMPLETELY
SUM OF RESPONSES TO QUESTIONS 2, 4, 5, & 10: 13
IN GENERAL, WOULD YOU SAY YOUR QUALITY OF LIFE IS...[ON A SCALE OF 1 (POOR) TO 5 (EXCELLENT)]: GOOD
IN GENERAL, HOW WOULD YOU RATE YOUR MENTAL HEALTH, INCLUDING YOUR MOOD AND YOUR ABILITY TO THINK [ON A SCALE OF 1 (POOR) TO 5 (EXCELLENT)]?: GOOD
IN GENERAL, HOW WOULD YOU RATE YOUR SATISFACTION WITH YOUR SOCIAL ACTIVITIES AND RELATIONSHIPS [ON A SCALE OF 1 (POOR) TO 5 (EXCELLENT)]?: GOOD
IN GENERAL, HOW WOULD YOU RATE YOUR PHYSICAL HEALTH [ON A SCALE OF 1 (POOR) TO 5 (EXCELLENT)]?: GOOD
IN THE PAST 7 DAYS, HOW WOULD YOU RATE YOUR PAIN ON AVERAGE [ON A SCALE FROM 0 (NO PAIN) TO 10 (WORST IMAGINABLE PAIN)]?: 5
IN THE PAST 7 DAYS, HOW WOULD YOU RATE YOUR FATIGUE ON AVERAGE [ON A SCALE FROM 1 (NONE) TO 5 (VERY SEVERE)]?: MILD
IN GENERAL, PLEASE RATE HOW WELL YOU CARRY OUT YOUR USUAL SOCIAL ACTIVITIES (INCLUDES ACTIVITIES AT HOME, AT WORK, AND IN YOUR COMMUNITY, AND RESPONSIBILITIES AS A PARENT, CHILD, SPOUSE, EMPLOYEE, FRIEND, ETC) [ON A SCALE OF 1 (POOR) TO 5 (EXCELLENT)]?: GOOD
IN GENERAL, WOULD YOU SAY YOUR HEALTH IS...[ON A SCALE OF 1 (POOR) TO 5 (EXCELLENT)]: GOOD
IN THE PAST 7 DAYS, HOW OFTEN HAVE YOU BEEN BOTHERED BY EMOTIONAL PROBLEMS, SUCH AS FEELING ANXIOUS, DEPRESSED, OR IRRITABLE [ON A SCALE FROM 1 (NEVER) TO 5 (ALWAYS)]?: RARELY

## 2024-12-12 ASSESSMENT — ROUTINE ASSESSMENT OF PATIENT INDEX DATA (RAPID3)
TOTAL RAPID3 SCORE: 7.5
ON A SCALE OF ONE TO TEN, HOW MUCH PAIN HAVE YOU HAD BECAUSE OF YOUR CONDITION OVER THE PAST WEEK?: 4.5
TOTAL RAPID3 SCORE: 7.5
ON A SCALE OF ONE TO TEN, CONSIDERING ALL THE WAYS IN WHICH ILLNESS AND HEALTH CONDITIONS MAY AFFECT YOU AT THIS TIME, PLEASE INDICATE BELOW HOW YOU ARE DOING:: 3
ON A SCALE OF ONE TO TEN, HOW DIFFICULT WAS IT FOR YOU TO COMPLETE THE LISTED DAILY PHYSICAL TASKS OVER THE LAST WEEK: 2.5

## 2024-12-12 NOTE — PROGRESS NOTES
[facility]  [address] [facility] [address]  Dept: [phone]  Dept Fax: [phone]  SUBJECTIVE     Chief Complaint   Patient presents with    Follow-up     For Adipex        Gloria PONCE Dieudonne is a24 y.o.female    Pt presents for supervised weight management visit- Finished month #4 adipex    Diet - snacking less, portion control. Stopped drinking coffee.     Exercise - about 10,000 steps daily. Occasional Yoga.      Starting weight- 218 lbs  Previous weight - 184 lbs  Current weight - 178 lbs  Weight Loss from last visit: 6 lbs  Total weight loss since start of treatment: 40 lbs  % lost from start of treatment:     Review of Systems   Constitutional:  Negative for chills, diaphoresis and fever.   Respiratory:  Negative for shortness of breath.    Cardiovascular:  Negative for chest pain, palpitations and leg swelling.   Gastrointestinal:  Negative for blood in stool, constipation, diarrhea, nausea and vomiting.   Genitourinary:  Negative for dysuria and hematuria.   Musculoskeletal:  Negative for myalgias.   Neurological:  Negative for dizziness and headaches.   All other systems reviewed and are negative.      OBJECTIVE     /72 (Site: Left Upper Arm, Position: Sitting)   Pulse 92   Temp 98 °F (36.7 °C)   Resp 16   Ht 1.6 m (5' 2.99\")   Wt 80.9 kg (178 lb 4.8 oz)   LMP 11/14/2024 (Approximate)   SpO2 97%   BMI 31.59 kg/m²     Wt Readings from Last 3 Encounters:   12/12/24 80.9 kg (178 lb 4.8 oz)   11/13/24 83.5 kg (184 lb 1.4 oz)   11/11/24 83.5 kg (184 lb)     Body mass index is 31.59 kg/m².      Physical Exam  Vitals and nursing note reviewed.   Constitutional:       Appearance: She is well-developed. She is obese.   HENT:      Head: Normocephalic and atraumatic.   Cardiovascular:      Rate and Rhythm: Normal rate and regular rhythm.      Heart sounds: Normal heart sounds.   Pulmonary:      Effort: Pulmonary effort is normal.      Breath sounds: Normal breath sounds.

## 2025-01-06 DIAGNOSIS — Z79.899 ENCOUNTER FOR LONG-TERM (CURRENT) USE OF HIGH-RISK MEDICATION: ICD-10-CM

## 2025-01-06 DIAGNOSIS — M19.90 INFLAMMATORY ARTHRITIS: ICD-10-CM

## 2025-01-06 RX ORDER — FOLIC ACID 1 MG/1
1000 TABLET ORAL DAILY
Qty: 90 TABLET | Refills: 1 | Status: SHIPPED | OUTPATIENT
Start: 2025-01-06

## 2025-01-09 ENCOUNTER — TELEPHONE (OUTPATIENT)
Dept: INTERNAL MEDICINE | Age: 25
End: 2025-01-09

## 2025-01-09 NOTE — TELEPHONE ENCOUNTER
Specialty Medication Service    Date: 1/9/2025  Patient's Name: Gloria Bro YOB: 2000            _____________________________________________________________________________________________    Patient no showed scheduled Medical Director appointment for Specialty Medication Services. Attempted to reach patient 3 times to complete appointment. Left voicemail for patient to return call to reschedule visit. Will continue outreach as appropriate.    David Gallegos, PharmD [facility]  Ambulatory Clinical Pharmacist  Specialty Medication Services  Phone: [phone]  Fax: [phone]    For Pharmacy Admin Tracking Only    Program: [email or web address]  CPA in place:  Yes  Time Spent (min): 15

## 2025-01-13 ENCOUNTER — TELEPHONE (OUTPATIENT)
Dept: INTERNAL MEDICINE | Age: 25
End: 2025-01-13

## 2025-01-13 DIAGNOSIS — M06.00 SERONEGATIVE RHEUMATOID ARTHRITIS (HCC): ICD-10-CM

## 2025-01-13 RX ORDER — MEDROXYPROGESTERONE ACETATE 150 MG/ML
INJECTION, SUSPENSION INTRAMUSCULAR
Qty: 4 ML | Refills: 5 | OUTPATIENT
Start: 2025-01-13

## 2025-01-13 NOTE — TELEPHONE ENCOUNTER
Specialty Medication Service    Date: 1/13/2025  Patient's Name: Gloria Bro YOB: 2000            _____________________________________________________________________________________________    Patient no longer has [employer] benefits (termed 12/31/24). Patient is no longer enrolled in SMS program. Left message to explain they will no longer be eligible for SMS services and that we will be discharging them from the program.  Informed patient future SMS appointments will be canceled and no further outreach planned.  Patient instructed to contact [facility] ([phone]) to provide updated insurance information for continuity in care if possible.     In addition, mailed letter/sent MyChart message (if applicable), updated SMS spreadsheet, deactivated any current SMS prescriptions and updated Therigy as well as alerted the pharmacy.     [facility] Con[facility]  Pharmacy   Specialty Medication Services   Phone: [phone] option 4    For Pharmacy Admin Tracking Only    Program: SMS  CPA in place:  Yes  Recommendation Provided To:   Intervention Detail:   Intervention Accepted By:   Gap Closed?:    Time Spent (min): 15

## 2025-01-15 ENCOUNTER — TELEMEDICINE (OUTPATIENT)
Dept: FAMILY MEDICINE CLINIC | Age: 25
End: 2025-01-15

## 2025-01-15 DIAGNOSIS — K52.9 ACUTE GASTROENTERITIS: Primary | ICD-10-CM

## 2025-01-15 RX ORDER — ONDANSETRON 4 MG/1
4 TABLET, FILM COATED ORAL 3 TIMES DAILY PRN
Qty: 30 TABLET | Refills: 0 | Status: SHIPPED | OUTPATIENT
Start: 2025-01-15

## 2025-01-15 ASSESSMENT — ENCOUNTER SYMPTOMS
BLOOD IN STOOL: 0
DIARRHEA: 1
VOMITING: 1
CONSTIPATION: 0
NAUSEA: 1
SHORTNESS OF BREATH: 0
ABDOMINAL DISTENTION: 1

## 2025-01-15 NOTE — PROGRESS NOTES
[facility]  [address] [facility] [place] [address]  Dept: [phone]  Dept Fax: [phone]      TELEHEALTH EVALUATION -- Audio/Visual (During COVID-19 public health emergency)     Gloria Bro, was evaluated through a synchronous (real-time) audio-video encounter. The patient (or guardian if applicable) is aware that this is a billable service, which includes applicable co-pays. This Virtual Visit was conducted with patient's (and/or legal guardian's) consent. The visit was conducted pursuant to the emergency declaration under the Lundberg Act and the National Emergencies Act, 1135 waiver authority and the Coronavirus Preparedness and Response Supplemental Appropriations Act. Patient identification was verified, and a caregiver was present when appropriate. The patient was located in a state where the provider was licensed to provide care.     SUBJECTIVE     Gloria Bro is a 24 y.o. female    Pt presents for nausea, vomiting, diarrhea, abd cramping, abd bloating starting a couple days ago. She attempted to go to work but threw up prior to going in. She can drink some fluids and is eating crackers.       Patient Active Problem List   Diagnosis    Rheumatoid arthritis with rheumatoid factor, unspecified    Rheumatoid arthritis, unspecified       Current Outpatient Medications   Medication Sig Dispense Refill    ondansetron (ZOFRAN) 4 MG tablet Take 1 tablet by mouth 3 times daily as needed for Nausea or Vomiting 30 tablet 0    folic acid (FOLVITE) 1 MG tablet TAKE ONE TABLET BY MOUTH ONCE A DAY 90 tablet 1    Etanercept (ENBREL SURECLICK) 50 MG/ML SOAJ Inject 50 mg into the skin once a week 4 mL 6    Methotrexate, PF, (RASUVO) 20 MG/0.4ML chemo injection pen Inject 20 mg into the skin once a week 4 Adjustable Dose Pre-filled Pen Syringe 1    FLUoxetine (PROZAC) 20 MG capsule TAKE ONE CAPSULE BY MOUTH ONCE A DAY WITH 40MG CAPSULE FOR A TOTAL OF 60MG DAILY 90 capsule 0    FLUoxetine (PROZAC) 40 MG

## 2025-01-29 ENCOUNTER — OFFICE VISIT (OUTPATIENT)
Dept: FAMILY MEDICINE CLINIC | Age: 25
End: 2025-01-29

## 2025-01-29 VITALS
BODY MASS INDEX: 29.27 KG/M2 | DIASTOLIC BLOOD PRESSURE: 74 MMHG | RESPIRATION RATE: 16 BRPM | TEMPERATURE: 98 F | HEART RATE: 72 BPM | SYSTOLIC BLOOD PRESSURE: 100 MMHG | WEIGHT: 165.2 LBS

## 2025-01-29 DIAGNOSIS — F41.9 ANXIETY: ICD-10-CM

## 2025-01-29 DIAGNOSIS — F32.A DEPRESSION, UNSPECIFIED DEPRESSION TYPE: ICD-10-CM

## 2025-01-29 DIAGNOSIS — M06.00 SERONEGATIVE RHEUMATOID ARTHRITIS (HCC): ICD-10-CM

## 2025-01-29 DIAGNOSIS — Z76.89 ENCOUNTER FOR WEIGHT MANAGEMENT: Primary | ICD-10-CM

## 2025-01-29 DIAGNOSIS — G47.00 INSOMNIA, UNSPECIFIED TYPE: ICD-10-CM

## 2025-01-29 RX ORDER — HYDROXYZINE HYDROCHLORIDE 25 MG/1
25 TABLET, FILM COATED ORAL EVERY 6 HOURS PRN
Qty: 90 TABLET | Refills: 0 | Status: SHIPPED | OUTPATIENT
Start: 2025-01-29

## 2025-01-29 RX ORDER — FLUOXETINE 40 MG/1
40 CAPSULE ORAL DAILY
Qty: 90 CAPSULE | Refills: 3 | Status: SHIPPED | OUTPATIENT
Start: 2025-01-29

## 2025-01-29 RX ORDER — PHENTERMINE HYDROCHLORIDE 37.5 MG/1
37.5 TABLET ORAL
Qty: 30 TABLET | Refills: 0 | Status: CANCELLED | OUTPATIENT
Start: 2025-01-29

## 2025-01-29 RX ORDER — BUPROPION HYDROCHLORIDE 300 MG/1
300 TABLET ORAL EVERY MORNING
Qty: 90 TABLET | Refills: 3 | Status: SHIPPED | OUTPATIENT
Start: 2025-01-29

## 2025-01-29 RX ORDER — PHENTERMINE HYDROCHLORIDE 37.5 MG/1
TABLET ORAL
COMMUNITY
Start: 2024-12-31

## 2025-01-29 SDOH — ECONOMIC STABILITY: INCOME INSECURITY: IN THE LAST 12 MONTHS, WAS THERE A TIME WHEN YOU WERE NOT ABLE TO PAY THE MORTGAGE OR RENT ON TIME?: PATIENT DECLINED

## 2025-01-29 SDOH — ECONOMIC STABILITY: TRANSPORTATION INSECURITY
IN THE PAST 12 MONTHS, HAS LACK OF TRANSPORTATION KEPT YOU FROM MEETINGS, WORK, OR FROM GETTING THINGS NEEDED FOR DAILY LIVING?: PATIENT DECLINED

## 2025-01-29 SDOH — ECONOMIC STABILITY: FOOD INSECURITY: WITHIN THE PAST 12 MONTHS, YOU WORRIED THAT YOUR FOOD WOULD RUN OUT BEFORE YOU GOT MONEY TO BUY MORE.: NEVER TRUE

## 2025-01-29 SDOH — ECONOMIC STABILITY: FOOD INSECURITY: WITHIN THE PAST 12 MONTHS, THE FOOD YOU BOUGHT JUST DIDN'T LAST AND YOU DIDN'T HAVE MONEY TO GET MORE.: NEVER TRUE

## 2025-01-29 SDOH — ECONOMIC STABILITY: FOOD INSECURITY: WITHIN THE PAST 12 MONTHS, YOU WORRIED THAT YOUR FOOD WOULD RUN OUT BEFORE YOU GOT MONEY TO BUY MORE.: PATIENT DECLINED

## 2025-01-29 SDOH — ECONOMIC STABILITY: TRANSPORTATION INSECURITY
IN THE PAST 12 MONTHS, HAS THE LACK OF TRANSPORTATION KEPT YOU FROM MEDICAL APPOINTMENTS OR FROM GETTING MEDICATIONS?: PATIENT DECLINED

## 2025-01-29 SDOH — ECONOMIC STABILITY: FOOD INSECURITY: WITHIN THE PAST 12 MONTHS, THE FOOD YOU BOUGHT JUST DIDN'T LAST AND YOU DIDN'T HAVE MONEY TO GET MORE.: PATIENT DECLINED

## 2025-01-29 ASSESSMENT — ENCOUNTER SYMPTOMS
SHORTNESS OF BREATH: 0
CONSTIPATION: 0
NAUSEA: 0
BLOOD IN STOOL: 0
DIARRHEA: 0
VOMITING: 0

## 2025-01-29 NOTE — PROGRESS NOTES
[facility]  [address] [facility] [id number]  Dept: [phone]  Dept Fax: [phone]  SUBJECTIVE     Chief Complaint   Patient presents with    Weight Management     Pt here for weight loss management        Gloria Bro is a24 y.o.female    Pt presents for supervised weight management visit- Finished month #5 adipex  Pt was ill during this last month so she did have to stop taking the medication for a few days.     Pt does have a history of anxiety and depression. This is controlled with current medications.     Diet - Protein shakes. Was ill so appetite was poor.    Exercise - Yoga. Lots of walking    Starting weight- 218 lbs  Previous weight - 178 lbs  Current weight - 165 lbs  Weight Loss from last visit: 13 lbs  Total weight loss since start of treatment: 53 lbs  % lost from start of treatment:     Review of Systems   Constitutional:  Negative for chills, diaphoresis and fever.   Respiratory:  Negative for shortness of breath.    Cardiovascular:  Negative for chest pain, palpitations and leg swelling.   Gastrointestinal:  Negative for blood in stool, constipation, diarrhea, nausea and vomiting.   Genitourinary:  Negative for dysuria and hematuria.   Musculoskeletal:  Negative for myalgias.   Neurological:  Negative for dizziness and headaches.   All other systems reviewed and are negative.      OBJECTIVE     /74 (Site: Left Upper Arm, Position: Sitting)   Pulse 72   Temp 98 °F (36.7 °C)   Resp 16   Wt 74.9 kg (165 lb 3.2 oz)   BMI 29.27 kg/m²     Wt Readings from Last 3 Encounters:   01/29/25 74.9 kg (165 lb 3.2 oz)   12/12/24 80.9 kg (178 lb 4.8 oz)   11/13/24 83.5 kg (184 lb 1.4 oz)     Body mass index is 29.27 kg/m².      Physical Exam  Vitals and nursing note reviewed.   Constitutional:       Appearance: She is well-developed. She is obese.   HENT:      Head: Normocephalic and atraumatic.   Cardiovascular:      Rate and Rhythm: Normal rate and regular rhythm.      Heart 
good.  · Breathe moist air from a hot shower or from a sink filled with hot water to help clear a stuffy nose. · Before you use cough and cold medicines, check the label. These medicines may not be safe for young children or for people with certain health problems. · If the skin around your nose and lips becomes sore, put some petroleum jelly on the area. · To ease coughing:  ¨ Drink fluids to soothe a scratchy throat. ¨ Suck on cough drops or plain hard candy. ¨ Take an over-the-counter cough medicine that contains dextromethorphan to help you get some sleep. Read and follow all instructions on the label. ¨ Raise your head at night with an extra pillow. This may help you rest if coughing keeps you awake. · Take any prescribed medicine exactly as directed. Call your doctor if you think you are having a problem with your medicine. To avoid spreading the flu  · Wash your hands regularly, and keep your hands away from your face. · Stay home from school, work, and other public places until you are feeling better and your fever has been gone for at least 24 hours. The fever needs to have gone away on its own without the help of medicine. · Ask people living with you to talk to their doctors about preventing the flu. They may get antiviral medicine to keep from getting the flu from you. · To prevent the flu in the future, get a flu vaccine every fall. Encourage people living with you to get the vaccine. · Cover your mouth when you cough or sneeze. When should you call for help? Call 911 anytime you think you may need emergency care. For example, call if:  ? · You have severe trouble breathing. ?Call your doctor now or seek immediate medical care if:  ? · You have new or worse trouble breathing. ? · You seem to be getting much sicker. ? · You feel very sleepy or confused. ? · You have a new or higher fever. ? · You get a new rash. ? Watch closely for changes in your health, and be sure to contact

## 2025-01-31 ENCOUNTER — LAB (OUTPATIENT)
Dept: LAB | Age: 25
End: 2025-01-31

## 2025-01-31 DIAGNOSIS — M06.00 SERONEGATIVE RHEUMATOID ARTHRITIS (HCC): ICD-10-CM

## 2025-01-31 DIAGNOSIS — Z79.899 ENCOUNTER FOR LONG-TERM (CURRENT) USE OF HIGH-RISK MEDICATION: ICD-10-CM

## 2025-01-31 LAB
ALBUMIN SERPL BCG-MCNC: 4 G/DL (ref 3.5–5.1)
ALP SERPL-CCNC: 87 U/L (ref 38–126)
ALT SERPL W/O P-5'-P-CCNC: 29 U/L (ref 11–66)
ANION GAP SERPL CALC-SCNC: 13 MEQ/L (ref 8–16)
AST SERPL-CCNC: 25 U/L (ref 5–40)
BASOPHILS ABSOLUTE: 0 THOU/MM3 (ref 0–0.1)
BASOPHILS NFR BLD AUTO: 0.5 %
BILIRUB SERPL-MCNC: 0.5 MG/DL (ref 0.3–1.2)
BUN SERPL-MCNC: 9 MG/DL (ref 7–22)
CALCIUM SERPL-MCNC: 9 MG/DL (ref 8.5–10.5)
CHLORIDE SERPL-SCNC: 107 MEQ/L (ref 98–111)
CO2 SERPL-SCNC: 20 MEQ/L (ref 23–33)
CREAT SERPL-MCNC: 0.6 MG/DL (ref 0.4–1.2)
CRP SERPL-MCNC: 1.38 MG/DL (ref 0–1)
DEPRECATED RDW RBC AUTO: 40 FL (ref 35–45)
EOSINOPHIL NFR BLD AUTO: 2.6 %
EOSINOPHILS ABSOLUTE: 0.2 THOU/MM3 (ref 0–0.4)
ERYTHROCYTE [DISTWIDTH] IN BLOOD BY AUTOMATED COUNT: 12.3 % (ref 11.5–14.5)
GFR SERPL CREATININE-BSD FRML MDRD: > 90 ML/MIN/1.73M2
GLUCOSE SERPL-MCNC: 84 MG/DL (ref 70–108)
HCT VFR BLD AUTO: 40.3 % (ref 37–47)
HGB BLD-MCNC: 13.3 GM/DL (ref 12–16)
IMM GRANULOCYTES # BLD AUTO: 0.01 THOU/MM3 (ref 0–0.07)
IMM GRANULOCYTES NFR BLD AUTO: 0.1 %
LYMPHOCYTES ABSOLUTE: 1.6 THOU/MM3 (ref 1–4.8)
LYMPHOCYTES NFR BLD AUTO: 21 %
MCH RBC QN AUTO: 29.5 PG (ref 26–33)
MCHC RBC AUTO-ENTMCNC: 33 GM/DL (ref 32.2–35.5)
MCV RBC AUTO: 89.4 FL (ref 81–99)
MONOCYTES ABSOLUTE: 0.6 THOU/MM3 (ref 0.4–1.3)
MONOCYTES NFR BLD AUTO: 7.2 %
NEUTROPHILS ABSOLUTE: 5.3 THOU/MM3 (ref 1.8–7.7)
NEUTROPHILS NFR BLD AUTO: 68.6 %
NRBC BLD AUTO-RTO: 0 /100 WBC
PLATELET # BLD AUTO: 260 THOU/MM3 (ref 130–400)
PMV BLD AUTO: 12.9 FL (ref 9.4–12.4)
POTASSIUM SERPL-SCNC: 3.8 MEQ/L (ref 3.5–5.2)
PROT SERPL-MCNC: 7 G/DL (ref 6.1–8)
RBC # BLD AUTO: 4.51 MILL/MM3 (ref 4.2–5.4)
SODIUM SERPL-SCNC: 140 MEQ/L (ref 135–145)
WBC # BLD AUTO: 7.7 THOU/MM3 (ref 4.8–10.8)

## 2025-02-18 ENCOUNTER — OFFICE VISIT (OUTPATIENT)
Age: 25
End: 2025-02-18
Payer: COMMERCIAL

## 2025-02-18 VITALS
SYSTOLIC BLOOD PRESSURE: 118 MMHG | WEIGHT: 165.12 LBS | OXYGEN SATURATION: 100 % | HEART RATE: 110 BPM | DIASTOLIC BLOOD PRESSURE: 76 MMHG | BODY MASS INDEX: 29.26 KG/M2 | HEIGHT: 63 IN

## 2025-02-18 DIAGNOSIS — M06.00 SERONEGATIVE RHEUMATOID ARTHRITIS (HCC): Primary | ICD-10-CM

## 2025-02-18 DIAGNOSIS — Z79.899 ENCOUNTER FOR LONG-TERM (CURRENT) USE OF HIGH-RISK MEDICATION: ICD-10-CM

## 2025-02-18 PROCEDURE — 99214 OFFICE O/P EST MOD 30 MIN: CPT | Performed by: INTERNAL MEDICINE

## 2025-02-18 RX ORDER — MELOXICAM 15 MG/1
15 TABLET ORAL DAILY PRN
Qty: 90 TABLET | Refills: 1 | Status: SHIPPED | OUTPATIENT
Start: 2025-02-18

## 2025-02-18 ASSESSMENT — ENCOUNTER SYMPTOMS
COUGH: 0
ABDOMINAL PAIN: 0
SHORTNESS OF BREATH: 0
VOMITING: 0
NAUSEA: 0

## 2025-02-18 NOTE — PROGRESS NOTES
[facility]   Rheumatology Clinic Note      2/18/2025         CHIEF COMPLAINT:    Chief Complaint   Patient presents with    Follow-up     3 month follow up Seronegative rheumatoid arthritis (HCC)    Joint Pain     Back and knee pain.   Pain level 5           HISTORY OF PRESENT ILLNESS:    24 y.o. female with polyarticular seronegative inflammatory arthritis (+CHIKIS, -RF, -CCP) presents for follow up. Presently, she is on methotrexate 20 mg SQ weekly (decreased last visit as she was doing well), folic acid 1 mg daily, and Enbrel 50 mg SQ once weekly (started early Dec 2023).  Past meds: hydroxychloroquine    Pain in right knee and lower back for past few days. No recent injury.  No joint swelling.  No prolonged morning stiffness.  Walking aggravates the pain.  Rates it at 5/10 in pain scale.        Past Medical History:     has a past medical history of Allergic, Anxiety, Depression, Headache, IBS (irritable bowel syndrome), Inflammatory bowel disease, Insomnia, Rheumatoid arthritis (HCC), Scoliosis, Torticollis, and Vision abnormalities.    Past Surgical History:     has a past surgical history that includes Adenoidectomy; Tonsillectomy; and Knee arthroscopy (Left).    Current Medications:      Current Outpatient Medications:     meloxicam (MOBIC) 15 MG tablet, Take 1 tablet by mouth daily as needed for Pain, Disp: 90 tablet, Rfl: 1    Methotrexate, PF, (RASUVO) 20 MG/0.4ML chemo injection pen, Inject 20 mg into the skin once a week, Disp: 4 Adjustable Dose Pre-filled Pen Syringe, Rfl: 3    etanercept (ENBREL) 50 MG/ML injection, Inject 0.5 mLs into the skin once a week for 30 doses, Disp: 4 mL, Rfl: 5    phentermine (ADIPEX-P) 37.5 MG tablet, TAKE 1 TABLET BY MOUTH EVERY MORNING BEFORE BREAKFAST FOR 30 DAYS. MAX 1 TABLET DAILY, Disp: , Rfl:     FLUoxetine (PROZAC) 40 MG capsule, Take 1 capsule by mouth daily, Disp: 90 capsule, Rfl: 3    FLUoxetine (PROZAC) 20 MG capsule, Take 1 capsule by mouth daily,

## 2025-02-19 ENCOUNTER — PATIENT MESSAGE (OUTPATIENT)
Age: 25
End: 2025-02-19

## 2025-04-14 ENCOUNTER — OFFICE VISIT (OUTPATIENT)
Dept: FAMILY MEDICINE CLINIC | Age: 25
End: 2025-04-14
Payer: COMMERCIAL

## 2025-04-14 VITALS
HEIGHT: 62 IN | SYSTOLIC BLOOD PRESSURE: 124 MMHG | WEIGHT: 151.8 LBS | RESPIRATION RATE: 16 BRPM | DIASTOLIC BLOOD PRESSURE: 84 MMHG | HEART RATE: 72 BPM | BODY MASS INDEX: 27.94 KG/M2 | TEMPERATURE: 97.7 F

## 2025-04-14 DIAGNOSIS — F32.A DEPRESSION, UNSPECIFIED DEPRESSION TYPE: ICD-10-CM

## 2025-04-14 DIAGNOSIS — M05.9 RHEUMATOID ARTHRITIS WITH RHEUMATOID FACTOR, UNSPECIFIED (HCC): ICD-10-CM

## 2025-04-14 DIAGNOSIS — F41.9 ANXIETY: Primary | ICD-10-CM

## 2025-04-14 PROCEDURE — G2211 COMPLEX E/M VISIT ADD ON: HCPCS | Performed by: NURSE PRACTITIONER

## 2025-04-14 PROCEDURE — 99214 OFFICE O/P EST MOD 30 MIN: CPT | Performed by: NURSE PRACTITIONER

## 2025-04-14 RX ORDER — ESCITALOPRAM OXALATE 10 MG/1
10 TABLET ORAL DAILY
Qty: 30 TABLET | Refills: 1 | Status: SHIPPED | OUTPATIENT
Start: 2025-04-14

## 2025-04-14 ASSESSMENT — ENCOUNTER SYMPTOMS
SHORTNESS OF BREATH: 0
BLOOD IN STOOL: 0
DIARRHEA: 0
NAUSEA: 0
VOMITING: 0
CONSTIPATION: 0

## 2025-04-14 NOTE — PROGRESS NOTES
12.8 oz)   LMP 03/14/2025 (Approximate)   BMI 27.76 kg/m²     Physical Exam  Vitals and nursing note reviewed.   Constitutional:       Appearance: She is well-developed.   HENT:      Head: Normocephalic and atraumatic.      Right Ear: External ear normal.      Left Ear: External ear normal.      Nose: Nose normal.   Eyes:      Conjunctiva/sclera: Conjunctivae normal.      Pupils: Pupils are equal, round, and reactive to light.   Cardiovascular:      Rate and Rhythm: Normal rate and regular rhythm.      Heart sounds: Normal heart sounds.   Pulmonary:      Effort: Pulmonary effort is normal.      Breath sounds: Normal breath sounds.   Abdominal:      General: Bowel sounds are normal.      Palpations: Abdomen is soft.   Musculoskeletal:         General: Normal range of motion.      Cervical back: Normal range of motion and neck supple.   Skin:     General: Skin is warm and dry.   Neurological:      Mental Status: She is alert and oriented to person, place, and time.      Deep Tendon Reflexes: Reflexes are normal and symmetric.   Psychiatric:         Mood and Affect: Affect is flat.         Behavior: Behavior normal.         Thought Content: Thought content normal.         Judgment: Judgment normal.           No results found for this visit on 04/14/25.      ASSESSMENT       Diagnosis Orders   1. Anxiety  escitalopram (LEXAPRO) 10 MG tablet      2. Depression, unspecified depression type  escitalopram (LEXAPRO) 10 MG tablet      3. Rheumatoid arthritis with rheumatoid factor, unspecified            PLAN     Requested Prescriptions     Signed Prescriptions Disp Refills    escitalopram (LEXAPRO) 10 MG tablet 30 tablet 1     Sig: Take 1 tablet by mouth daily       Stop prozac, start lexapro  Continue wellbutrin  Follow up in 4-6 weeks    Electronically signed by ARSH Granger CNP on 4/14/2025 at 9:30 AM

## 2025-04-28 DIAGNOSIS — Z79.899 ENCOUNTER FOR LONG-TERM (CURRENT) USE OF HIGH-RISK MEDICATION: ICD-10-CM

## 2025-04-28 DIAGNOSIS — M06.00 SERONEGATIVE RHEUMATOID ARTHRITIS (HCC): ICD-10-CM

## 2025-04-28 RX ORDER — METHOTREXATE 20 MG/.4ML
INJECTION, SOLUTION SUBCUTANEOUS
Qty: 4.8 ML | Refills: 3 | OUTPATIENT
Start: 2025-04-28

## 2025-05-19 ENCOUNTER — LAB (OUTPATIENT)
Dept: LAB | Age: 25
End: 2025-05-19

## 2025-05-19 ENCOUNTER — PATIENT MESSAGE (OUTPATIENT)
Age: 25
End: 2025-05-19

## 2025-05-19 DIAGNOSIS — Z79.899 ENCOUNTER FOR LONG-TERM (CURRENT) USE OF HIGH-RISK MEDICATION: ICD-10-CM

## 2025-05-19 DIAGNOSIS — M06.00 SERONEGATIVE RHEUMATOID ARTHRITIS (HCC): ICD-10-CM

## 2025-05-19 LAB
ALBUMIN SERPL BCG-MCNC: 3.9 G/DL (ref 3.4–4.9)
ALP SERPL-CCNC: 91 U/L (ref 38–126)
ALT SERPL W/O P-5'-P-CCNC: 14 U/L (ref 10–35)
ANION GAP SERPL CALC-SCNC: 10 MEQ/L (ref 8–16)
AST SERPL-CCNC: 17 U/L (ref 10–35)
BASOPHILS ABSOLUTE: 0.1 THOU/MM3 (ref 0–0.1)
BASOPHILS NFR BLD AUTO: 1 %
BILIRUB SERPL-MCNC: 0.6 MG/DL (ref 0.3–1.2)
BUN SERPL-MCNC: 13 MG/DL (ref 8–23)
CALCIUM SERPL-MCNC: 8.8 MG/DL (ref 8.6–10)
CHLORIDE SERPL-SCNC: 104 MEQ/L (ref 98–111)
CO2 SERPL-SCNC: 23 MEQ/L (ref 22–29)
CREAT SERPL-MCNC: 0.8 MG/DL (ref 0.5–0.9)
CRP SERPL-MCNC: 0.81 MG/DL (ref 0–0.5)
DEPRECATED RDW RBC AUTO: 42.5 FL (ref 35–45)
EOSINOPHIL NFR BLD AUTO: 1.4 %
EOSINOPHILS ABSOLUTE: 0.1 THOU/MM3 (ref 0–0.4)
ERYTHROCYTE [DISTWIDTH] IN BLOOD BY AUTOMATED COUNT: 13 % (ref 11.5–14.5)
ERYTHROCYTE [SEDIMENTATION RATE] IN BLOOD BY WESTERGREN METHOD: 22 MM/HR (ref 0–20)
GFR SERPL CREATININE-BSD FRML MDRD: > 90 ML/MIN/1.73M2
GLUCOSE SERPL-MCNC: 87 MG/DL (ref 74–109)
HCT VFR BLD AUTO: 40.1 % (ref 37–47)
HGB BLD-MCNC: 13.1 GM/DL (ref 12–16)
IMM GRANULOCYTES # BLD AUTO: 0.02 THOU/MM3 (ref 0–0.07)
IMM GRANULOCYTES NFR BLD AUTO: 0.3 %
LYMPHOCYTES ABSOLUTE: 2.1 THOU/MM3 (ref 1–4.8)
LYMPHOCYTES NFR BLD AUTO: 29.2 %
MCH RBC QN AUTO: 29.4 PG (ref 26–33)
MCHC RBC AUTO-ENTMCNC: 32.7 GM/DL (ref 32.2–35.5)
MCV RBC AUTO: 89.9 FL (ref 81–99)
MONOCYTES ABSOLUTE: 0.4 THOU/MM3 (ref 0.4–1.3)
MONOCYTES NFR BLD AUTO: 5.9 %
NEUTROPHILS ABSOLUTE: 4.5 THOU/MM3 (ref 1.8–7.7)
NEUTROPHILS NFR BLD AUTO: 62.2 %
NRBC BLD AUTO-RTO: 0 /100 WBC
PLATELET # BLD AUTO: 314 THOU/MM3 (ref 130–400)
PMV BLD AUTO: 10.6 FL (ref 9.4–12.4)
POTASSIUM SERPL-SCNC: 4.3 MEQ/L (ref 3.5–5.2)
PROT SERPL-MCNC: 7 G/DL (ref 6.4–8.3)
RBC # BLD AUTO: 4.46 MILL/MM3 (ref 4.2–5.4)
SODIUM SERPL-SCNC: 137 MEQ/L (ref 135–145)
WBC # BLD AUTO: 7.3 THOU/MM3 (ref 4.8–10.8)

## 2025-05-29 ENCOUNTER — OFFICE VISIT (OUTPATIENT)
Age: 25
End: 2025-05-29
Payer: COMMERCIAL

## 2025-05-29 VITALS
HEART RATE: 105 BPM | HEIGHT: 62 IN | DIASTOLIC BLOOD PRESSURE: 72 MMHG | BODY MASS INDEX: 28.32 KG/M2 | SYSTOLIC BLOOD PRESSURE: 114 MMHG | OXYGEN SATURATION: 99 % | WEIGHT: 153.9 LBS

## 2025-05-29 DIAGNOSIS — Z79.899 ENCOUNTER FOR LONG-TERM (CURRENT) USE OF HIGH-RISK MEDICATION: ICD-10-CM

## 2025-05-29 DIAGNOSIS — M19.90 INFLAMMATORY ARTHRITIS: ICD-10-CM

## 2025-05-29 DIAGNOSIS — M06.00 SERONEGATIVE RHEUMATOID ARTHRITIS (HCC): ICD-10-CM

## 2025-05-29 PROCEDURE — 99214 OFFICE O/P EST MOD 30 MIN: CPT | Performed by: INTERNAL MEDICINE

## 2025-05-29 RX ORDER — FOLIC ACID 1 MG/1
1000 TABLET ORAL DAILY
Qty: 90 TABLET | Refills: 1 | Status: SHIPPED | OUTPATIENT
Start: 2025-05-29

## 2025-05-29 ASSESSMENT — ENCOUNTER SYMPTOMS
VOMITING: 0
NAUSEA: 0
ABDOMINAL PAIN: 0
COUGH: 0
SHORTNESS OF BREATH: 0

## 2025-05-29 ASSESSMENT — JOINT PAIN
TOTAL NUMBER OF TENDER JOINTS: 0
TOTAL NUMBER OF SWOLLEN JOINTS: 0

## 2025-05-29 NOTE — PROGRESS NOTES
[facility]   Rheumatology Clinic Note      5/29/2025         CHIEF COMPLAINT:    Chief Complaint   Patient presents with    Follow-up     Seronegative rheumatoid arthritis (HCC)    Joint Pain     Upper and lower back pain.  Pain level 5          HISTORY OF PRESENT ILLNESS:    24 y.o. female with polyarticular seronegative inflammatory arthritis (+CHIKIS, -RF, -CCP) presents for follow up. Presently, she is on methotrexate 20 mg SQ weekly, folic acid 1 mg daily, and Enbrel 50 mg SQ once weekly (started early Dec 2023).  Past meds: hydroxychloroquine    Reports experiencing pain in her neck and lower back. This tends to be more pronounced a day or two before her methotrexate and Enbrel dose. Improves when taking her medication. Also reports that work tends to affect her pain as she lifts and stands all the time. Rates her pain at 5/10. Meloxicam eases her pain, takes it occasionally.  No prolonged morning stiffness.      Past Medical History:     has a past medical history of Allergic, Anxiety, Depression, Headache, IBS (irritable bowel syndrome), Inflammatory bowel disease, Insomnia, Rheumatoid arthritis (HCC), Scoliosis, Torticollis, and Vision abnormalities.    Past Surgical History:     has a past surgical history that includes Adenoidectomy; Tonsillectomy; and Knee arthroscopy (Left).    Current Medications:      Current Outpatient Medications:     folic acid (FOLVITE) 1 MG tablet, Take 1 tablet by mouth daily, Disp: 90 tablet, Rfl: 1    Methotrexate, PF, (RASUVO) 20 MG/0.4ML chemo injection pen, Inject 20 mg into the skin once a week, Disp: 12 Adjustable Dose Pre-filled Pen Syringe, Rfl: 0    escitalopram (LEXAPRO) 10 MG tablet, Take 1 tablet by mouth daily, Disp: 30 tablet, Rfl: 1    meloxicam (MOBIC) 15 MG tablet, Take 1 tablet by mouth daily as needed for Pain, Disp: 90 tablet, Rfl: 1    etanercept (ENBREL) 50 MG/ML injection, Inject 0.5 mLs into the skin once a week for 30 doses, Disp: 4 mL, Rfl:

## 2025-07-04 DIAGNOSIS — M06.00 SERONEGATIVE RHEUMATOID ARTHRITIS (HCC): ICD-10-CM

## 2025-07-04 DIAGNOSIS — Z79.899 ENCOUNTER FOR LONG-TERM (CURRENT) USE OF HIGH-RISK MEDICATION: ICD-10-CM

## 2025-07-07 RX ORDER — MELOXICAM 15 MG/1
15 TABLET ORAL DAILY PRN
Qty: 90 TABLET | Refills: 1 | Status: SHIPPED | OUTPATIENT
Start: 2025-07-07

## 2025-07-30 ENCOUNTER — TELEPHONE (OUTPATIENT)
Age: 25
End: 2025-07-30

## 2025-07-30 NOTE — TELEPHONE ENCOUNTER
Received fax from [facility] stating multiple attempts were made to contact patient to schedule Enbrel. Patient notified and instructed to contact [facility].

## 2025-08-05 DIAGNOSIS — Z79.899 ENCOUNTER FOR LONG-TERM (CURRENT) USE OF HIGH-RISK MEDICATION: ICD-10-CM

## 2025-08-05 DIAGNOSIS — M06.00 SERONEGATIVE RHEUMATOID ARTHRITIS (HCC): ICD-10-CM

## 2025-08-06 ENCOUNTER — LAB (OUTPATIENT)
Dept: LAB | Age: 25
End: 2025-08-06

## 2025-08-06 ENCOUNTER — OFFICE VISIT (OUTPATIENT)
Dept: FAMILY MEDICINE CLINIC | Age: 25
End: 2025-08-06
Payer: COMMERCIAL

## 2025-08-06 VITALS
RESPIRATION RATE: 16 BRPM | HEART RATE: 80 BPM | DIASTOLIC BLOOD PRESSURE: 78 MMHG | BODY MASS INDEX: 29 KG/M2 | SYSTOLIC BLOOD PRESSURE: 102 MMHG | HEIGHT: 62 IN | TEMPERATURE: 98.4 F | WEIGHT: 157.6 LBS

## 2025-08-06 DIAGNOSIS — G47.00 INSOMNIA, UNSPECIFIED TYPE: ICD-10-CM

## 2025-08-06 DIAGNOSIS — F41.9 ANXIETY: Primary | ICD-10-CM

## 2025-08-06 DIAGNOSIS — M06.00 SERONEGATIVE RHEUMATOID ARTHRITIS (HCC): ICD-10-CM

## 2025-08-06 DIAGNOSIS — Z79.899 ENCOUNTER FOR LONG-TERM (CURRENT) USE OF HIGH-RISK MEDICATION: ICD-10-CM

## 2025-08-06 DIAGNOSIS — R51.9 NONINTRACTABLE HEADACHE, UNSPECIFIED CHRONICITY PATTERN, UNSPECIFIED HEADACHE TYPE: ICD-10-CM

## 2025-08-06 LAB
ALBUMIN SERPL BCG-MCNC: 4.4 G/DL (ref 3.4–4.9)
ALP SERPL-CCNC: 78 U/L (ref 38–126)
ALT SERPL W/O P-5'-P-CCNC: 11 U/L (ref 10–35)
ANION GAP SERPL CALC-SCNC: 10 MEQ/L (ref 8–16)
AST SERPL-CCNC: 17 U/L (ref 10–35)
BASOPHILS ABSOLUTE: 0.1 THOU/MM3 (ref 0–0.1)
BASOPHILS NFR BLD AUTO: 1.2 %
BILIRUB SERPL-MCNC: 0.6 MG/DL (ref 0.3–1.2)
BUN SERPL-MCNC: 10 MG/DL (ref 8–23)
CALCIUM SERPL-MCNC: 9.3 MG/DL (ref 8.6–10)
CHLORIDE SERPL-SCNC: 106 MEQ/L (ref 98–111)
CO2 SERPL-SCNC: 26 MEQ/L (ref 22–29)
CREAT SERPL-MCNC: 1.1 MG/DL (ref 0.5–0.9)
CRP SERPL-MCNC: < 0.3 MG/DL (ref 0–0.5)
DEPRECATED RDW RBC AUTO: 42.2 FL (ref 35–45)
EOSINOPHIL NFR BLD AUTO: 2.5 %
EOSINOPHILS ABSOLUTE: 0.1 THOU/MM3 (ref 0–0.4)
ERYTHROCYTE [DISTWIDTH] IN BLOOD BY AUTOMATED COUNT: 12.7 % (ref 11.5–14.5)
ERYTHROCYTE [SEDIMENTATION RATE] IN BLOOD BY WESTERGREN METHOD: 13 MM/HR (ref 0–20)
GFR SERPL CREATININE-BSD FRML MDRD: 71 ML/MIN/1.73M2
GLUCOSE SERPL-MCNC: 84 MG/DL (ref 74–109)
HCT VFR BLD AUTO: 41 % (ref 37–47)
HGB BLD-MCNC: 13.5 GM/DL (ref 12–16)
IMM GRANULOCYTES # BLD AUTO: 0.01 THOU/MM3 (ref 0–0.07)
IMM GRANULOCYTES NFR BLD AUTO: 0.2 %
LYMPHOCYTES ABSOLUTE: 1.6 THOU/MM3 (ref 1–4.8)
LYMPHOCYTES NFR BLD AUTO: 26.3 %
MCH RBC QN AUTO: 30.1 PG (ref 26–33)
MCHC RBC AUTO-ENTMCNC: 32.9 GM/DL (ref 32.2–35.5)
MCV RBC AUTO: 91.5 FL (ref 81–99)
MONOCYTES ABSOLUTE: 0.4 THOU/MM3 (ref 0.4–1.3)
MONOCYTES NFR BLD AUTO: 6.7 %
NEUTROPHILS ABSOLUTE: 3.7 THOU/MM3 (ref 1.8–7.7)
NEUTROPHILS NFR BLD AUTO: 63.1 %
NRBC BLD AUTO-RTO: 0 /100 WBC
PLATELET # BLD AUTO: 307 THOU/MM3 (ref 130–400)
PMV BLD AUTO: 11.1 FL (ref 9.4–12.4)
POTASSIUM SERPL-SCNC: 4.4 MEQ/L (ref 3.5–5.2)
PROT SERPL-MCNC: 7.5 G/DL (ref 6.4–8.3)
RBC # BLD AUTO: 4.48 MILL/MM3 (ref 4.2–5.4)
SODIUM SERPL-SCNC: 142 MEQ/L (ref 135–145)
WBC # BLD AUTO: 5.9 THOU/MM3 (ref 4.8–10.8)

## 2025-08-06 PROCEDURE — 99213 OFFICE O/P EST LOW 20 MIN: CPT | Performed by: NURSE PRACTITIONER

## 2025-08-06 RX ORDER — METHOTREXATE 20 MG/.4ML
INJECTION, SOLUTION SUBCUTANEOUS
Qty: 4.8 ML | Refills: 3 | OUTPATIENT
Start: 2025-08-06

## 2025-08-06 RX ORDER — ESCITALOPRAM OXALATE 20 MG/1
20 TABLET ORAL DAILY
Qty: 90 TABLET | Refills: 0 | Status: SHIPPED | OUTPATIENT
Start: 2025-08-06

## 2025-08-06 RX ORDER — TRAZODONE HYDROCHLORIDE 50 MG/1
25-50 TABLET ORAL NIGHTLY PRN
Qty: 30 TABLET | Refills: 1 | Status: SHIPPED | OUTPATIENT
Start: 2025-08-06

## 2025-08-11 ASSESSMENT — ENCOUNTER SYMPTOMS
SHORTNESS OF BREATH: 0
BLOOD IN STOOL: 0
VOMITING: 0
DIARRHEA: 0
CONSTIPATION: 0
NAUSEA: 0

## 2025-08-26 ENCOUNTER — OFFICE VISIT (OUTPATIENT)
Age: 25
End: 2025-08-26
Payer: COMMERCIAL

## 2025-08-26 VITALS
OXYGEN SATURATION: 97 % | HEART RATE: 95 BPM | WEIGHT: 157.63 LBS | HEIGHT: 62 IN | BODY MASS INDEX: 29.01 KG/M2 | DIASTOLIC BLOOD PRESSURE: 70 MMHG | SYSTOLIC BLOOD PRESSURE: 112 MMHG

## 2025-08-26 DIAGNOSIS — M06.00 SERONEGATIVE RHEUMATOID ARTHRITIS (HCC): Primary | ICD-10-CM

## 2025-08-26 DIAGNOSIS — Z79.899 ENCOUNTER FOR LONG-TERM (CURRENT) USE OF HIGH-RISK MEDICATION: ICD-10-CM

## 2025-08-26 DIAGNOSIS — M06.00 SERONEGATIVE RHEUMATOID ARTHRITIS (HCC): ICD-10-CM

## 2025-08-26 PROCEDURE — 99214 OFFICE O/P EST MOD 30 MIN: CPT | Performed by: INTERNAL MEDICINE

## 2025-08-26 ASSESSMENT — ENCOUNTER SYMPTOMS
NAUSEA: 0
ABDOMINAL PAIN: 0
COUGH: 0
SHORTNESS OF BREATH: 0
VOMITING: 0

## 2025-08-26 ASSESSMENT — LIFESTYLE VARIABLES
HOW MANY STANDARD DRINKS CONTAINING ALCOHOL DO YOU HAVE ON A TYPICAL DAY: PATIENT DOES NOT DRINK
HOW OFTEN DO YOU HAVE A DRINK CONTAINING ALCOHOL: NEVER

## 2025-08-27 RX ORDER — METHOTREXATE 20 MG/.4ML
INJECTION, SOLUTION SUBCUTANEOUS
Qty: 4.8 ML | Refills: 0 | Status: SHIPPED | OUTPATIENT
Start: 2025-08-27